# Patient Record
Sex: FEMALE | Race: WHITE | NOT HISPANIC OR LATINO | Employment: FULL TIME | URBAN - METROPOLITAN AREA
[De-identification: names, ages, dates, MRNs, and addresses within clinical notes are randomized per-mention and may not be internally consistent; named-entity substitution may affect disease eponyms.]

---

## 2019-04-21 ENCOUNTER — HOSPITAL ENCOUNTER (OUTPATIENT)
Dept: URGENT CARE | Facility: CLINIC | Age: 24
Discharge: HOME | End: 2019-04-21
Attending: NURSE PRACTITIONER

## 2019-07-17 ENCOUNTER — HOSPITAL ENCOUNTER (OUTPATIENT)
Dept: LAB | Facility: HOSPITAL | Age: 24
Discharge: HOME | End: 2019-07-17
Attending: NURSE PRACTITIONER

## 2020-05-30 ENCOUNTER — HOSPITAL ENCOUNTER (OUTPATIENT)
Dept: OTHER | Facility: HOSPITAL | Age: 25
Discharge: HOME | End: 2020-05-30
Attending: NURSE PRACTITIONER

## 2020-05-30 LAB — SARS-COV-2 RNA SPEC QL NAA+PROBE: NOT DETECTED

## 2020-05-31 ENCOUNTER — HOSPITAL ENCOUNTER (OUTPATIENT)
Dept: OTHER | Facility: HOSPITAL | Age: 25
Discharge: HOME | End: 2020-05-31
Attending: NURSE PRACTITIONER

## 2020-05-31 LAB — SARS-COV-2 RNA SPEC QL NAA+PROBE: NOT DETECTED

## 2020-07-30 ENCOUNTER — HOSPITAL ENCOUNTER (OUTPATIENT)
Dept: OTHER | Facility: HOSPITAL | Age: 25
Discharge: HOME | End: 2020-07-30

## 2020-07-30 LAB
ALBUMIN SERPL-MCNC: 4.9 G/DL (ref 3.5–5)
ALBUMIN/GLOB SERPL: 2 {RATIO} (ref 1.4–2.6)
ALP SERPL-CCNC: 61 U/L (ref 42–98)
ALT SERPL-CCNC: 13 U/L (ref 10–40)
ANION GAP SERPL CALC-SCNC: 21 MMOL/L (ref 8–19)
AST SERPL-CCNC: 17 U/L (ref 15–50)
BASOPHILS # BLD AUTO: 0.04 10*3/UL (ref 0–0.2)
BASOPHILS NFR BLD AUTO: 1 % (ref 0–3)
BILIRUB SERPL-MCNC: 1.25 MG/DL (ref 0.2–1.3)
BUN SERPL-MCNC: 16 MG/DL (ref 5–25)
BUN/CREAT SERPL: 20 {RATIO} (ref 6–20)
CALCIUM SERPL-MCNC: 9.8 MG/DL (ref 8.7–10.4)
CHLORIDE SERPL-SCNC: 103 MMOL/L (ref 99–111)
CHOLEST SERPL-MCNC: 169 MG/DL (ref 107–200)
CHOLEST/HDLC SERPL: 2.5 {RATIO} (ref 3–6)
CONV ABS IMM GRAN: 0 10*3/UL (ref 0–0.2)
CONV CO2: 23 MMOL/L (ref 22–32)
CONV IMMATURE GRAN: 0 % (ref 0–1.8)
CONV TOTAL PROTEIN: 7.4 G/DL (ref 6.3–8.2)
CREAT UR-MCNC: 0.79 MG/DL (ref 0.5–0.9)
DEPRECATED RDW RBC AUTO: 39.7 FL (ref 36.4–46.3)
EOSINOPHIL # BLD AUTO: 0.07 10*3/UL (ref 0–0.7)
EOSINOPHIL # BLD AUTO: 1.7 % (ref 0–7)
ERYTHROCYTE [DISTWIDTH] IN BLOOD BY AUTOMATED COUNT: 11.9 % (ref 11.7–14.4)
GFR SERPLBLD BASED ON 1.73 SQ M-ARVRAT: >60 ML/MIN/{1.73_M2}
GLOBULIN UR ELPH-MCNC: 2.5 G/DL (ref 2–3.5)
GLUCOSE SERPL-MCNC: 83 MG/DL (ref 65–99)
HCT VFR BLD AUTO: 43.3 % (ref 37–47)
HDLC SERPL-MCNC: 67 MG/DL (ref 40–60)
HGB BLD-MCNC: 14.2 G/DL (ref 12–16)
LDLC SERPL CALC-MCNC: 90 MG/DL (ref 70–100)
LYMPHOCYTES # BLD AUTO: 1.39 10*3/UL (ref 1–5)
LYMPHOCYTES NFR BLD AUTO: 33.7 % (ref 20–45)
MCH RBC QN AUTO: 29.8 PG (ref 27–31)
MCHC RBC AUTO-ENTMCNC: 32.8 G/DL (ref 33–37)
MCV RBC AUTO: 91 FL (ref 81–99)
MONOCYTES # BLD AUTO: 0.27 10*3/UL (ref 0.2–1.2)
MONOCYTES NFR BLD AUTO: 6.6 % (ref 3–10)
NEUTROPHILS # BLD AUTO: 2.35 10*3/UL (ref 2–8)
NEUTROPHILS NFR BLD AUTO: 57 % (ref 30–85)
NRBC CBCN: 0 % (ref 0–0.7)
OSMOLALITY SERPL CALC.SUM OF ELEC: 294 MOSM/KG (ref 273–304)
PLATELET # BLD AUTO: 250 10*3/UL (ref 130–400)
PMV BLD AUTO: 9.6 FL (ref 9.4–12.3)
POTASSIUM SERPL-SCNC: 4.5 MMOL/L (ref 3.5–5.3)
RBC # BLD AUTO: 4.76 10*6/UL (ref 4.2–5.4)
SODIUM SERPL-SCNC: 142 MMOL/L (ref 135–147)
TRIGL SERPL-MCNC: 59 MG/DL (ref 40–150)
TSH SERPL-ACNC: 0.87 M[IU]/L (ref 0.27–4.2)
VLDLC SERPL-MCNC: 12 MG/DL (ref 5–37)
WBC # BLD AUTO: 4.12 10*3/UL (ref 4.8–10.8)

## 2020-10-23 ENCOUNTER — HOSPITAL ENCOUNTER (OUTPATIENT)
Dept: OTHER | Facility: HOSPITAL | Age: 25
Discharge: HOME | End: 2020-10-23
Attending: OBSTETRICS & GYNECOLOGY

## 2020-10-28 LAB
CONV LAST MENSTURAL PERIOD: NORMAL
SPECIMEN SOURCE: NORMAL
SPECIMEN SOURCE: NORMAL
THIN PREP CVX: NORMAL

## 2021-01-05 ENCOUNTER — HOSPITAL ENCOUNTER (OUTPATIENT)
Dept: OTHER | Facility: HOSPITAL | Age: 26
Discharge: HOME | End: 2021-01-05
Attending: INTERNAL MEDICINE

## 2021-01-29 ENCOUNTER — HOSPITAL ENCOUNTER (OUTPATIENT)
Dept: OTHER | Facility: HOSPITAL | Age: 26
Discharge: HOME | End: 2021-01-29
Attending: INTERNAL MEDICINE

## 2021-08-04 ENCOUNTER — TRANSCRIBE ORDERS (OUTPATIENT)
Dept: ADMINISTRATIVE | Facility: HOSPITAL | Age: 26
End: 2021-08-04

## 2021-08-04 ENCOUNTER — LAB (OUTPATIENT)
Dept: LAB | Facility: HOSPITAL | Age: 26
End: 2021-08-04
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
## 2021-08-04 DIAGNOSIS — Z77.21 EXPOSURE TO BODY FLUIDS BY CONTAMINATED HYPODERMIC NEEDLE STICK: Primary | ICD-10-CM

## 2021-08-04 DIAGNOSIS — Z77.21 EXPOSURE TO BODY FLUIDS BY CONTAMINATED HYPODERMIC NEEDLE STICK: ICD-10-CM
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
## 2021-08-04 PROCEDURE — 36415 COLL VENOUS BLD VENIPUNCTURE: CPT

## 2021-08-04 PROCEDURE — 86701 HIV-1ANTIBODY: CPT

## 2021-08-04 PROCEDURE — 86803 HEPATITIS C AB TEST: CPT

## 2021-08-04 PROCEDURE — 86702 HIV-2 ANTIBODY: CPT

## 2021-08-05 LAB
HCV AB SER DONR QL: NORMAL
HIV 1 & 2 AB SERPLBLD IA.RAPID: NEGATIVE
HIV 2 AB SERPLBLD QL IA.RAPID: NEGATIVE
HIV1 AB SERPLBLD QL IA.RAPID: NEGATIVE

## 2022-05-18 PROBLEM — Z82.79 FAMILY HISTORY OF CONGENITAL HEART DEFECT: Status: ACTIVE | Noted: 2018-10-25

## 2022-05-18 PROBLEM — J30.9 ALLERGIC RHINITIS: Status: ACTIVE | Noted: 2021-06-23

## 2022-05-18 PROBLEM — J45.909 ASTHMA: Status: ACTIVE | Noted: 2021-06-23

## 2022-05-18 PROBLEM — B07.9 VIRAL WARTS: Status: ACTIVE | Noted: 2021-06-23

## 2022-05-19 ENCOUNTER — OFFICE VISIT (OUTPATIENT)
Dept: OBSTETRICS AND GYNECOLOGY | Facility: CLINIC | Age: 27
End: 2022-05-19

## 2022-05-19 VITALS
SYSTOLIC BLOOD PRESSURE: 115 MMHG | HEIGHT: 64 IN | WEIGHT: 162.6 LBS | HEART RATE: 81 BPM | BODY MASS INDEX: 27.76 KG/M2 | DIASTOLIC BLOOD PRESSURE: 73 MMHG

## 2022-05-19 DIAGNOSIS — Z01.419 ENCOUNTER FOR ANNUAL ROUTINE GYNECOLOGICAL EXAMINATION: Primary | ICD-10-CM

## 2022-05-19 PROCEDURE — G0123 SCREEN CERV/VAG THIN LAYER: HCPCS | Performed by: NURSE PRACTITIONER

## 2022-05-19 PROCEDURE — 87624 HPV HI-RISK TYP POOLED RSLT: CPT | Performed by: NURSE PRACTITIONER

## 2022-05-19 PROCEDURE — 99395 PREV VISIT EST AGE 18-39: CPT | Performed by: NURSE PRACTITIONER

## 2022-05-19 RX ORDER — CETIRIZINE HYDROCHLORIDE 10 MG/1
10 TABLET ORAL AS NEEDED
COMMUNITY

## 2022-05-19 RX ORDER — SERTRALINE HYDROCHLORIDE 100 MG/1
1 TABLET, FILM COATED ORAL DAILY
COMMUNITY
Start: 2022-05-09

## 2022-05-19 NOTE — PROGRESS NOTES
GYN Annual Exam     CC - Here for annual exam.        HPI  Grisel Logan is a 26 y.o. female, , who presents for annual well woman exam. Patient's last menstrual period was 2022 (exact date)..  Periods are regular every 25-35 days, lasting 7 days. .  Dysmenorrhea:none.  Patient reports problems with: none.  There were no changes to her medical or surgical history since her last visit.. Partner Status: Marital Status: .  She is sexually active. She has not had new partners since her last STD testing. She does not desire STD testing. She does not use condoms with IC.. She has had her HPV vaccines.     Additional OB/GYN History   Current contraception: contraceptive methods: IUD.  Insertion date:   Desires to: continue contraception  Last Pap :   Last Completed Pap Smear     This patient has no relevant Health Maintenance data.        History of abnormal Pap smear: no  Family history of uterine, colon, breast, or ovarian cancer: yes - pat. grandmother colon  Exercises Regularly:no  Feelings of Anxiety or Depression: no  Tobacco Usage?: No     OB History        1    Para   1    Term   1            AB        Living   1       SAB        IAB        Ectopic        Molar        Multiple        Live Births   1                Health Maintenance   Topic Date Due   • Annual Gynecologic Pelvic and Breast Exam  Never done   • ANNUAL PHYSICAL  Never done   • Pneumococcal Vaccine 0-64 (1 - PCV) Never done   • PAP SMEAR  Never done   • INFLUENZA VACCINE  2022   • TDAP/TD VACCINES (4 - Td or Tdap) 2029   • HEPATITIS C SCREENING  Completed   • COVID-19 Vaccine  Completed   • HPV VACCINES  Completed       The additional following portions of the patient's history were reviewed and updated as appropriate: allergies, current medications, past family history, past medical history, past social history, past surgical history and problem list.    Review of Systems   Constitutional: Negative.   "  HENT: Negative.    Eyes: Negative.    Respiratory: Negative.    Cardiovascular: Negative.    Gastrointestinal: Negative.    Endocrine: Negative.    Genitourinary: Negative.    Musculoskeletal: Negative.    Skin: Negative.    Allergic/Immunologic: Negative.         No new allergies.   Neurological: Negative.    Hematological: Negative.    Psychiatric/Behavioral: Negative.          I have reviewed and agree with the HPI, ROS, and historical information as entered above. Marino Iniguez, APRN    Objective   /73 (BP Location: Right arm, Patient Position: Sitting, Cuff Size: Adult)   Pulse 81   Ht 162.6 cm (64\")   Wt 73.8 kg (162 lb 9.6 oz)   LMP 05/17/2022 (Exact Date)   Breastfeeding No   BMI 27.91 kg/m²     Physical Exam  Vitals and nursing note reviewed. Exam conducted with a chaperone present.   Constitutional:       Appearance: Normal appearance. She is well-developed and well-groomed.   HENT:      Head: Normocephalic.   Eyes:      Pupils: Pupils are equal, round, and reactive to light.   Neck:      Thyroid: No thyroid mass or thyromegaly.   Cardiovascular:      Rate and Rhythm: Normal rate and regular rhythm.      Heart sounds: Normal heart sounds.   Pulmonary:      Effort: Pulmonary effort is normal.      Breath sounds: Normal breath sounds.   Chest:   Breasts:      Right: Normal. No inverted nipple, mass, nipple discharge or skin change.      Left: Normal. No inverted nipple, mass, nipple discharge or skin change.       Abdominal:      General: Abdomen is flat. Bowel sounds are normal.      Palpations: Abdomen is soft.      Tenderness: There is no abdominal tenderness.   Genitourinary:     General: Normal vulva.      Exam position: Lithotomy position.      Pubic Area: No rash or pubic lice.       Vagina: Normal.      Cervix: No cervical motion tenderness.      Uterus: Normal.       Adnexa: Right adnexa normal and left adnexa normal.        Right: No mass, tenderness or fullness.          Left: No " mass, tenderness or fullness.        Rectum: Normal.         Musculoskeletal:      Cervical back: Normal range of motion and neck supple.   Skin:     General: Skin is warm and dry.   Neurological:      General: No focal deficit present.      Mental Status: She is alert.   Psychiatric:         Attention and Perception: Attention and perception normal.         Mood and Affect: Mood and affect normal.         Speech: Speech normal.         Behavior: Behavior normal. Behavior is cooperative.         Cognition and Memory: Cognition normal.         Judgment: Judgment normal.            Assessment and Plan    Problem List Items Addressed This Visit    None     Visit Diagnoses     Encounter for annual routine gynecological examination    -  Primary    Relevant Orders    IGP,rfx Aptima HPV All Pth          1. GYN annual well woman exam.   2. Reviewed monthly self breast exams.  Instructed to call with lumps, pain, or breast discharge.    3. Reviewed exercise as a preventative health measures.   4. Reccommended Flu Vaccine in Fall of each year.  5. RTC in 1 year or PRN with problems      Marino Iniguez, APRN  05/19/2022

## 2022-05-28 LAB
CONV .: ABNORMAL
CYTOLOGIST CVX/VAG CYTO: ABNORMAL
CYTOLOGY CVX/VAG DOC CYTO: ABNORMAL
CYTOLOGY CVX/VAG DOC THIN PREP: ABNORMAL
DX ICD CODE: ABNORMAL
DX ICD CODE: ABNORMAL
HIV 1 & 2 AB SER-IMP: ABNORMAL
HPV I/H RISK 4 DNA CVX QL PROBE+SIG AMP: NEGATIVE
OTHER STN SPEC: ABNORMAL
PATHOLOGIST CVX/VAG CYTO: ABNORMAL
STAT OF ADQ CVX/VAG CYTO-IMP: ABNORMAL

## 2022-06-07 ENCOUNTER — OFFICE VISIT (OUTPATIENT)
Dept: OBSTETRICS AND GYNECOLOGY | Facility: CLINIC | Age: 27
End: 2022-06-07

## 2022-06-07 VITALS
DIASTOLIC BLOOD PRESSURE: 76 MMHG | HEIGHT: 64 IN | HEART RATE: 97 BPM | SYSTOLIC BLOOD PRESSURE: 118 MMHG | WEIGHT: 161.8 LBS | BODY MASS INDEX: 27.62 KG/M2

## 2022-06-07 DIAGNOSIS — Z30.432 ENCOUNTER FOR IUD REMOVAL: Primary | ICD-10-CM

## 2022-06-07 PROCEDURE — 58301 REMOVE INTRAUTERINE DEVICE: CPT | Performed by: OBSTETRICS & GYNECOLOGY

## 2022-06-07 NOTE — PROGRESS NOTES
"IUD Removal      CC: Presents for IUD removal     I reviewed the procedure in detail.  She understand the potential risks include, but are not limited to, pain, bleeding, and infection.  Her questions have been answered.    Subjective:  Here for IUD removal.  Planning pregnancy.  Already on prenatal vitamins.    Objective:  /76   Pulse 97   Ht 162.6 cm (64\")   Wt 73.4 kg (161 lb 12.8 oz)   LMP 05/17/2022 (Exact Date)   BMI 27.77 kg/m²     Physical Exam  Vitals and nursing note reviewed. Exam conducted with a chaperone present.   Constitutional:       General: She is not in acute distress.     Appearance: Normal appearance. She is normal weight. She is not ill-appearing.   Abdominal:      General: Abdomen is flat. There is no distension.      Palpations: Abdomen is soft. There is no mass.      Tenderness: There is no abdominal tenderness. There is no guarding or rebound.      Hernia: No hernia is present.   Genitourinary:     General: Normal vulva.      Exam position: Lithotomy position.      Pubic Area: No rash.       Labia:         Right: No rash, tenderness, lesion or injury.         Left: No rash, tenderness, lesion or injury.       Urethra: No prolapse, urethral pain, urethral swelling or urethral lesion.      Vagina: Normal.      Cervix: Normal.      Comments: The patient's IUD string was approximately 2 cm in length.  It was easily grasped with a ring forcep and extracted without difficulty.  After the extraction the IUD was inspected and noted to be intact.  There is no bleeding after removal of the IUD.  The patient tolerated procedure well and with minimal cramping post procedure.  Musculoskeletal:      Right lower leg: No edema.      Left lower leg: No edema.   Skin:     General: Skin is warm and dry.      Findings: No rash.   Neurological:      Mental Status: She is alert and oriented to person, place, and time.   Psychiatric:         Mood and Affect: Mood normal.         Behavior: Behavior " normal.         Thought Content: Thought content normal.         Judgment: Judgment normal.     ASSESSMENT AND PLAN:    Diagnoses and all orders for this visit:    1. Encounter for IUD removal (Primary)  Assessment & Plan:  Successful IUD removal  Continue prenatal vitamins  Commend awaiting until first menstrual cycle before attempting pregnancy          Counseling:  She understand she can become pregnant immediately.  I recommend she keep track of menses and RTO if <q21d, >7d long, heavy or painful.    R/B/A/SE/efficacy of all BC options reviewed with respect to her individual medical history.   She has decided on None for BC.  If she desires pregnancy I have encouraged a healthy lifestyle and PNV.     PRECAUTIONS - She was advised to watch for fever, chills, vaginal discharge or odor.      Follow Up:  Return if symptoms worsen or fail to improve.        Arturo Quach MD  06/07/2022

## 2022-06-08 PROBLEM — Z30.432 ENCOUNTER FOR IUD REMOVAL: Status: ACTIVE | Noted: 2022-06-08

## 2022-06-08 NOTE — ASSESSMENT & PLAN NOTE
Successful IUD removal  Continue prenatal vitamins  Commend awaiting until first menstrual cycle before attempting pregnancy

## 2022-12-28 ENCOUNTER — OFFICE VISIT (OUTPATIENT)
Dept: OBSTETRICS AND GYNECOLOGY | Facility: CLINIC | Age: 27
End: 2022-12-28

## 2022-12-28 VITALS
HEART RATE: 100 BPM | BODY MASS INDEX: 28.34 KG/M2 | DIASTOLIC BLOOD PRESSURE: 68 MMHG | HEIGHT: 64 IN | SYSTOLIC BLOOD PRESSURE: 113 MMHG | WEIGHT: 166 LBS

## 2022-12-28 DIAGNOSIS — N81.2 INCOMPLETE UTEROVAGINAL PROLAPSE: Primary | ICD-10-CM

## 2022-12-28 PROCEDURE — 99213 OFFICE O/P EST LOW 20 MIN: CPT | Performed by: OBSTETRICS & GYNECOLOGY

## 2022-12-28 NOTE — PROGRESS NOTES
"GYN Visit    CC: Pelvic prolapse    HPI:   27 y.o. who presents with concerns of pelvic prolapse.  Patient reports having some pelvic discomfort with intercourse.  She reports having some pelvic pressure particular with long periods of standing as well as she thinks she can feel her cervix when in the shower.      History: PMHx, Meds, Allergies, PSHx, Social Hx, and POBHx all reviewed and updated.    /68   Pulse 100   Ht 162.6 cm (64\")   Wt 75.3 kg (166 lb)   LMP 12/15/2022   Breastfeeding No   BMI 28.49 kg/m²     Physical Exam  Vitals and nursing note reviewed. Exam conducted with a chaperone present.   Constitutional:       General: She is not in acute distress.     Appearance: Normal appearance. She is not ill-appearing.   HENT:      Head: Normocephalic and atraumatic.   Genitourinary:     General: Normal vulva.      Exam position: Lithotomy position.      Labia:         Right: No rash, tenderness, lesion or injury.         Left: No rash, tenderness, lesion or injury.       Vagina: Normal.      Cervix: Normal.      Uterus: With uterine prolapse.       Comments: There is a small grade 1 cystocele.  With Valsalva the cervix descends to about 2 cm above the hymenal ring.  Musculoskeletal:         General: No swelling.      Right lower leg: No edema.      Left lower leg: No edema.   Skin:     General: Skin is warm and dry.      Findings: No rash.   Neurological:      Mental Status: She is alert and oriented to person, place, and time.   Psychiatric:         Mood and Affect: Mood normal.         Behavior: Behavior normal.         Thought Content: Thought content normal.         Judgment: Judgment normal.           ASSESSMENT AND PLAN:  Diagnoses and all orders for this visit:    1. Incomplete uterovaginal prolapse (Primary)  Assessment & Plan:  The patient does have some uterovaginal prolapse.  The prolapse is mild to moderate at this time.  The patient is having some symptoms.  She does desire " future children and is actively trying to conceive.  We discussed that management of prolapse usually involves either physical therapy or surgery, or both.  I have recommended avoiding surgical correction of prolapse until childbearing is complete.  We discussed that the surgical management of prolapse can complicate pregnancies and pregnancies can cause recurrent prolapse after surgical corrections.  The patient expressed some concerns about prolapse affecting her ability to get pregnant.  I discussed that I did not have concerns that her prolapse was creating a fertility issue for her at this time.  We discussed that prolapse is simply a descent of the pelvic organs lower in the pelvis and is very common after childbirth.  We discussed that childbirth is the #1 risk factor for pelvic organ prolapse.  We discussed chronic standing and heavy lifting can also worsen prolapse.  At this time, I recommended the patient really focus on Kegel exercises and do these several times a day.  We discussed the option of pelvic floor therapy.  I am not sure how much benefit pelvic floor therapy would be over Kegel exercises at this time.  I recommended OTC Tylenol and or ibuprofen for any pelvic discomfort that she may have.  Recommend a daily multivitamin with folic acid or prenatal vitamin if attempting pregnancy.        Follow Up:  Return for Annual physical.    Arturo Quach MD  12/28/2022

## 2022-12-29 PROBLEM — Z30.432 ENCOUNTER FOR IUD REMOVAL: Status: RESOLVED | Noted: 2022-06-08 | Resolved: 2022-12-29

## 2022-12-29 PROBLEM — N81.2 INCOMPLETE UTEROVAGINAL PROLAPSE: Status: ACTIVE | Noted: 2022-12-29

## 2022-12-29 PROBLEM — J30.9 ALLERGIC RHINITIS: Status: RESOLVED | Noted: 2021-06-23 | Resolved: 2022-12-29

## 2022-12-29 NOTE — ASSESSMENT & PLAN NOTE
The patient does have some uterovaginal prolapse.  The prolapse is mild to moderate at this time.  The patient is having some symptoms.  She does desire future children and is actively trying to conceive.  We discussed that management of prolapse usually involves either physical therapy or surgery, or both.  I have recommended avoiding surgical correction of prolapse until childbearing is complete.  We discussed that the surgical management of prolapse can complicate pregnancies and pregnancies can cause recurrent prolapse after surgical corrections.  The patient expressed some concerns about prolapse affecting her ability to get pregnant.  I discussed that I did not have concerns that her prolapse was creating a fertility issue for her at this time.  We discussed that prolapse is simply a descent of the pelvic organs lower in the pelvis and is very common after childbirth.  We discussed that childbirth is the #1 risk factor for pelvic organ prolapse.  We discussed chronic standing and heavy lifting can also worsen prolapse.  At this time, I recommended the patient really focus on Kegel exercises and do these several times a day.  We discussed the option of pelvic floor therapy.  I am not sure how much benefit pelvic floor therapy would be over Kegel exercises at this time.  I recommended OTC Tylenol and or ibuprofen for any pelvic discomfort that she may have.  Recommend a daily multivitamin with folic acid or prenatal vitamin if attempting pregnancy.

## 2023-03-29 ENCOUNTER — TELEPHONE (OUTPATIENT)
Dept: OBSTETRICS AND GYNECOLOGY | Facility: CLINIC | Age: 28
End: 2023-03-29
Payer: COMMERCIAL

## 2023-03-29 DIAGNOSIS — O36.80X0 PREGNANCY WITH INCONCLUSIVE FETAL VIABILITY, SINGLE OR UNSPECIFIED FETUS: Primary | ICD-10-CM

## 2023-03-29 NOTE — TELEPHONE ENCOUNTER
Patient recently found out that she is pregnant, LMP 02/23/23.  She was scheduled for RGOB 06/12/2023.  I am pending an ultrasound order for signature.

## 2023-04-21 ENCOUNTER — HOSPITAL ENCOUNTER (OUTPATIENT)
Dept: ULTRASOUND IMAGING | Facility: HOSPITAL | Age: 28
Discharge: HOME | End: 2023-04-21
Payer: COMMERCIAL

## 2023-04-21 ENCOUNTER — TELEPHONE (OUTPATIENT)
Dept: OBSTETRICS AND GYNECOLOGY | Facility: CLINIC | Age: 28
End: 2023-04-21
Payer: COMMERCIAL

## 2023-04-21 DIAGNOSIS — O36.80X0 PREGNANCY WITH INCONCLUSIVE FETAL VIABILITY, SINGLE OR UNSPECIFIED FETUS: ICD-10-CM

## 2023-04-21 DIAGNOSIS — O36.80X0 PREGNANCY WITH INCONCLUSIVE FETAL VIABILITY, SINGLE OR UNSPECIFIED FETUS: Primary | ICD-10-CM

## 2023-04-21 PROCEDURE — 76801 OB US < 14 WKS SINGLE FETUS: CPT

## 2023-04-21 NOTE — TELEPHONE ENCOUNTER
Received call from Dr. Hernandez in radiology for concerns for early pregnancy loss. 5 mm CRL no FCA. Recommendation for follow up U/S in clinic in 7-10 days for confirmation of early pregnancy loss. Order placed for follow up U/S. Miscarriage, bleeding precautions given.     Baljeet Gonzalez MD

## 2023-04-24 ENCOUNTER — PREP FOR SURGERY (OUTPATIENT)
Dept: OTHER | Facility: HOSPITAL | Age: 28
End: 2023-04-24
Payer: COMMERCIAL

## 2023-04-24 ENCOUNTER — TELEPHONE (OUTPATIENT)
Dept: OBSTETRICS AND GYNECOLOGY | Facility: CLINIC | Age: 28
End: 2023-04-24
Payer: COMMERCIAL

## 2023-04-24 ENCOUNTER — OFFICE VISIT (OUTPATIENT)
Dept: OBSTETRICS AND GYNECOLOGY | Facility: CLINIC | Age: 28
End: 2023-04-24
Payer: COMMERCIAL

## 2023-04-24 ENCOUNTER — PATIENT MESSAGE (OUTPATIENT)
Dept: OBSTETRICS AND GYNECOLOGY | Facility: CLINIC | Age: 28
End: 2023-04-24
Payer: COMMERCIAL

## 2023-04-24 VITALS
DIASTOLIC BLOOD PRESSURE: 60 MMHG | WEIGHT: 165 LBS | BODY MASS INDEX: 28.17 KG/M2 | HEIGHT: 64 IN | HEART RATE: 75 BPM | SYSTOLIC BLOOD PRESSURE: 105 MMHG

## 2023-04-24 DIAGNOSIS — Z32.01 PREGNANCY TEST POSITIVE: Primary | ICD-10-CM

## 2023-04-24 DIAGNOSIS — O02.1 MISSED ABORTION: ICD-10-CM

## 2023-04-24 DIAGNOSIS — O02.1 MISSED ABORTION: Primary | ICD-10-CM

## 2023-04-24 LAB
B-HCG UR QL: POSITIVE
EXPIRATION DATE: ABNORMAL
INTERNAL NEGATIVE CONTROL: ABNORMAL
INTERNAL POSITIVE CONTROL: ABNORMAL
Lab: ABNORMAL

## 2023-04-24 PROCEDURE — S0260 H&P FOR SURGERY: HCPCS | Performed by: OBSTETRICS & GYNECOLOGY

## 2023-04-24 RX ORDER — ONDANSETRON 2 MG/ML
4 INJECTION INTRAMUSCULAR; INTRAVENOUS EVERY 6 HOURS PRN
Status: CANCELLED | OUTPATIENT
Start: 2023-04-24

## 2023-04-24 RX ORDER — SODIUM CHLORIDE, SODIUM LACTATE, POTASSIUM CHLORIDE, CALCIUM CHLORIDE 600; 310; 30; 20 MG/100ML; MG/100ML; MG/100ML; MG/100ML
150 INJECTION, SOLUTION INTRAVENOUS CONTINUOUS
Status: CANCELLED | OUTPATIENT
Start: 2023-04-24

## 2023-04-24 NOTE — TELEPHONE ENCOUNTER
JUSTYN  Please advise.  The patient was scheduled for 04/28/23 for an ultrasound and follow up with Dr. Gonzalez.  The patient told me she is experiencing nausea, pain that comes and goes, dizziness and passing small clots now.  She is supposed to be going out of the country 05/01/23 but realizes she may only do so if she is medically cleared.  She is concerned and would like to know what to do next and if the appointment scheduled for 04/28/23 is still ok?

## 2023-04-24 NOTE — H&P
GYN HISTORY & PHYSICAL      Patient Name: Grisel Logan  : 1995  MRN: [id number]    Subjective     Chief Complaint:   Chief Complaint   Patient presents with   • Follow-up     Follow up for MAB        HPI:  27 y.o.  Patient's last menstrual period was 2023.  Social History     Substance and Sexual Activity   Sexual Activity Yes   • Partners: Male   • Birth control/protection: None       US Ob < 14 Weeks Single or First Gestation (2023 15:24)     VB now intermittent.  Clots last night.  Changing a pad only 2-3x/day.  No pain.   A+ by [employer]    Patient had ultrasound ordered by [facility] office after she had a positive pregnancy test and it showed fetal demise at 6+ weeks.  Patient should be 8+4 weeks by LMP.  Patient desired a follow-up visit sooner than was scheduled with Dr. Schwartz as she is leaving the country for [facility] on 1st of May.    ROS: All negative except listed in HPI    Personal History     PMHx:    Past Medical History:   Diagnosis Date   • Anxiety    • Depression        OBHx:   OB History    Para Term  AB Living   2 1 1     1   SAB IAB Ectopic Molar Multiple Live Births             1      # Outcome Date GA Lbr Kj/2nd Weight Sex Delivery Anes PTL Lv   2 Current            1 Term 19 40w0d   M Vag-Spont  N MYCHAL        Medications:  sertraline    Allergies:  No Known Allergies    PSHx:   Past Surgical History:   Procedure Laterality Date   • CYST REMOVAL      bronchial cleft cyst   • WISDOM TOOTH EXTRACTION         Social History:    reports that she has never smoked. She has never used smokeless tobacco. She reports that she does not currently use alcohol. She reports that she does not currently use drugs.    Family History:   Family History   Problem Relation Age of Onset   • Hypertension Father    • Hypertension Mother    • Diabetes Paternal Grandfather    • Colon cancer Paternal Grandmother    • Hypertension Paternal Grandmother    • Stroke Paternal  Grandmother    • Cancer Maternal Grandmother         Pt unsure   • Hypertension Maternal Grandfather    • Stomach cancer Maternal Grandfather    • Clotting disorder Maternal Grandfather    • Breast cancer Neg Hx    • Ovarian cancer Neg Hx        Immunizations: Non contributory to this admission        Objective     Vitals:   Heart Rate:  [75] 75  BP: (105)/(60) 105/60    PHYSICAL EXAM:   General- NAD, alert and oriented, appropriate  Psych- Normal mood, good memory  Cardiovascular-not examined  Respiratory-speaking in full sentences no shortness of air  Abdomen- NABS, soft, non distended, non tender, no masses    External genitalia- Normal female, no lesions  Urethra/meatus- Normal, no masses, non tender, no prolapse  Bladder- Normal, no masses, non tender, no prolapse  Vagina- Normal, no atrophy, no lesions, no discharge, no prolapse,  small amount of maroon blood in vault  Cervix- Closed  Uterus- Small size, normal shape & consistency.  Non tender, mobile, & no prolapse, Retroverted   Bedside US: INDICATION: viability size less than dates by LMP, COMPARISON: 23, METHOD: Transvaginal, FINDINGS: Crown-rump length 6 weeks and 2 days (6+1 on ), no fetal heart rate, color flow used to assist,  IMPRESSION: 6 weeks and 2 days by ultrasound, no fetal heart rate, consistent with missed AB  Adnexa- No mass, non tender  Anus/Rectum/Perineum- Not performed    Lymphatic- No palpable groin nodes  Extremities- No edema, no cyanosis    Skin- No lesions, no rashes    Lab/Imaging/Other: See HPI      Assessment / Plan     Assessment:  Diagnoses and all orders for this visit:    1. Missed   Overview:  6 weeks by ultrasound, 8 weeks by LMP      Plan:   • Suction D&C.  • Counseling: Risks and benefits and alternatives of surgery were discussed with patient.  Risks are not limited to anesthesia, bleeding, blood transfusion, infection, damage to surrounding organs, wound separation, re-operation, thromboembolic disease,  death.  • See separate written and signed consent for surgical specific risks and benefits.          Signature: Electronically signed by Nidia Pollard DO, 04/24/23, 4:29 PM EDT.      [facility]  [address] [place] [address]  Dept: [phone]  Dept Fax: [phone]  Loc: [phone]  Loc Fax: [phone]

## 2023-04-24 NOTE — TELEPHONE ENCOUNTER
From: Grisel Logan  To: Arturo Quach  Sent: 4/24/2023 9:39 AM EDT  Subject: Possible Miscarriage     Dr. Quach,    I have called to be seen in office and am awaiting a response from a telephone encounter that was sent. I'm just a little anxious so messaging you directly as well.     As my chart states I was seen Friday for transvaginal ultrasound. Baby only measured 6 weeks, but based on last menstrual date I should be 8 weeks gestation. No fetal heartbeat noted.    *I've had spotting from 4/14 - 4/21.  *4/21 I started with increased bleeding that has been occuring to the present.   *I have started passing small clots.  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 Coupled with cramping that comes and goes and some dizziness I would like to be seen in the office to evaluate if I'm indeed miscarrying or not.  * If a D/C is needed I need to be seen prior to Friday when I'm currently scheduled to be seen as I'm scheduled to go out of country 5/1.    Thank you for your time.    Grisel Logan

## 2023-04-24 NOTE — PROGRESS NOTES
GYN HISTORY & PHYSICAL      Patient Name: Grisel Logan  : 1995  MRN: [id number]    Subjective     Chief Complaint:   Chief Complaint   Patient presents with   • Follow-up     Follow up for MAB        HPI:  27 y.o.  Patient's last menstrual period was 2023.  Social History     Substance and Sexual Activity   Sexual Activity Yes   • Partners: Male   • Birth control/protection: None       US Ob < 14 Weeks Single or First Gestation (2023 15:24)     VB now intermittent.  Clots last night.  Changing a pad only 2-3x/day.  No pain.   A+ by [employer]    Patient had ultrasound ordered by her office after she had a positive pregnancy test and it showed fetal demise at 6+ weeks.  Patient should be 8+4 weeks by LMP.  Patient desired a follow-up visit sooner than was scheduled with Dr. Schwartz as she is leaving the country for [facility] on 1 May.    ROS: All negative except listed in HPI    Personal History     PMHx:    Past Medical History:   Diagnosis Date   • Anxiety    • Depression        OBHx:   OB History    Para Term  AB Living   2 1 1     1   SAB IAB Ectopic Molar Multiple Live Births             1      # Outcome Date GA Lbr Kj/2nd Weight Sex Delivery Anes PTL Lv   2 Current            1 Term 19 40w0d   M Vag-Spont  N MYCHAL        Medications:  sertraline    Allergies:  No Known Allergies    PSHx:   Past Surgical History:   Procedure Laterality Date   • CYST REMOVAL      bronchial cleft cyst   • WISDOM TOOTH EXTRACTION         Social History:    reports that she has never smoked. She has never used smokeless tobacco. She reports that she does not currently use alcohol. She reports that she does not currently use drugs.    Family History:   Family History   Problem Relation Age of Onset   • Hypertension Father    • Hypertension Mother    • Diabetes Paternal Grandfather    • Colon cancer Paternal Grandmother    • Hypertension Paternal Grandmother    • Stroke Paternal  Grandmother    • Cancer Maternal Grandmother         Pt unsure   • Hypertension Maternal Grandfather    • Stomach cancer Maternal Grandfather    • Clotting disorder Maternal Grandfather    • Breast cancer Neg Hx    • Ovarian cancer Neg Hx        Immunizations: Non contributory to this admission        Objective     Vitals:   Heart Rate:  [75] 75  BP: (105)/(60) 105/60    PHYSICAL EXAM:   General- NAD, alert and oriented, appropriate  Psych- Normal mood, good memory  Cardiovascular-not examined  Respiratory-speaking in full sentences no shortness of air  Abdomen- NABS, soft, non distended, non tender, no masses    External genitalia- Normal female, no lesions  Urethra/meatus- Normal, no masses, non tender, no prolapse  Bladder- Normal, no masses, non tender, no prolapse  Vagina- Normal, no atrophy, no lesions, no discharge, no prolapse, small amount of maroon blood in vault  Cervix- Closed  Uterus- Small size, normal shape & consistency.  Non tender, mobile, & no prolapse, Retroverted   Bedside US: INDICATION: viability size less than dates by LMP, COMPARISON: 23, METHOD: Transvaginal, FINDINGS: Crown-rump length 6 weeks and 2 days (6+1 on ), no fetal heart rate, color flow used to assist,  IMPRESSION: 6 weeks and 2 days by ultrasound, no fetal heart rate, consistent with missed AB  Adnexa- No mass, non tender  Anus/Rectum/Perineum- Not performed    Lymphatic- No palpable groin nodes  Extremities- No edema, no cyanosis    Skin- No lesions, no rashes    Lab/Imaging/Other: See HPI      Assessment / Plan     Assessment:  Diagnoses and all orders for this visit:    1. Pregnancy test positive (Primary)  -     POC Pregnancy, Urine    2. Missed   Overview:  Added automatically from request for surgery [id number]        Plan:   • We discussed the risk, benefits, alternatives, side effects, efficacy of expectant management, medication management, versus dilation and curettage.  Patient desires suction  D&C.  • Counseling: Risks and benefits and alternatives of surgery were discussed with patient.  Risks are not limited to anesthesia, bleeding, blood transfusion, infection, damage to surrounding organs, wound separation, re-operation, thromboembolic disease, death.  • See separate written and signed consent for surgical specific risks and benefits.    Return in about 4 days (around 4/28/2023) for Postop OV.          Signature: Electronically signed by Nidia Pollard DO, 04/24/23, 4:29 PM EDT.      [facility]  [address] [place] [address]  Dept: [phone]  Dept Fax: [phone]  Loc: [phone]  Loc Fax: [phone]

## 2023-04-24 NOTE — TELEPHONE ENCOUNTER
I have spoken with the patient.  Dr. Gonzalez had talked with her last week and due to the last ultrasound she had, he had her scheduled for a follow up on 04/28/23.  She has had more concerning symptoms since.  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

## 2023-04-24 NOTE — TELEPHONE ENCOUNTER
Caller: Grisel Logan    Relationship to patient: Self    Best call back number: [phone]    PT IS BETWEEN TO 6-8 WKS GEST.  SHE BELIEVES SHE IS HAVING A MISCARRIAGE.   PT HAS BEEN SPOTTING  FOR ABOUT A WK INCREASED BLEEDING SINCE Friday.(TOTAL DAYS BLEEDING ABOUT 12). SHE HAS BEEN HAVING CLOTS SINCE LAST NIGHT.  CRAMPS COME AND GO.  PT IS GO[place] OUT OF [place] ON 05-.  PT WOULD LIKE TO BEE SEEN TODAY OR AS SOON AS POSSIBLE.  SHE DOES HAVE AN APPT ON 04- BUT CANT WAIT THAT LONG TO BE SEEN.  PLEASE CALL PT BACK AT ANYTIME AND CAN LVM.     UNABLE TO WT CALL.  THANK YOU.

## 2023-04-24 NOTE — TELEPHONE ENCOUNTER
The patient was resting and not feeling well so I spoke with her  Miguel who is on the patient's verbal agreement.  He said she would like to be seen today in case she were to need a D+C.  I have scheduled her for 2:15 pm.

## 2023-04-26 ENCOUNTER — ANESTHESIA (OUTPATIENT)
Dept: PERIOP | Facility: HOSPITAL | Age: 28
End: 2023-04-26
Payer: COMMERCIAL

## 2023-04-26 ENCOUNTER — ANESTHESIA EVENT (OUTPATIENT)
Dept: PERIOP | Facility: HOSPITAL | Age: 28
End: 2023-04-26
Payer: COMMERCIAL

## 2023-04-26 ENCOUNTER — HOSPITAL ENCOUNTER (OUTPATIENT)
Facility: HOSPITAL | Age: 28
Setting detail: HOSPITAL OUTPATIENT SURGERY
Discharge: HOME | End: 2023-04-26
Attending: OBSTETRICS & GYNECOLOGY | Admitting: OBSTETRICS & GYNECOLOGY
Payer: COMMERCIAL

## 2023-04-26 VITALS
HEART RATE: 90 BPM | SYSTOLIC BLOOD PRESSURE: 116 MMHG | BODY MASS INDEX: 27.81 KG/M2 | HEIGHT: 65 IN | DIASTOLIC BLOOD PRESSURE: 65 MMHG | RESPIRATION RATE: 14 BRPM | TEMPERATURE: 97.7 F | WEIGHT: 166.89 LBS | OXYGEN SATURATION: 100 %

## 2023-04-26 DIAGNOSIS — O02.1 MISSED ABORTION: ICD-10-CM

## 2023-04-26 LAB
ABO GROUP BLD: NORMAL
ABO GROUP BLD: NORMAL
BLD GP AB SCN SERPL QL: NEGATIVE
RH BLD: POSITIVE
RH BLD: POSITIVE
T&S EXPIRATION DATE: NORMAL

## 2023-04-26 PROCEDURE — 86850 RBC ANTIBODY SCREEN: CPT | Performed by: OBSTETRICS & GYNECOLOGY

## 2023-04-26 PROCEDURE — 86900 BLOOD TYPING SEROLOGIC ABO: CPT

## 2023-04-26 PROCEDURE — 86901 BLOOD TYPING SEROLOGIC RH(D): CPT | Performed by: OBSTETRICS & GYNECOLOGY

## 2023-04-26 PROCEDURE — 59820 CARE OF MISCARRIAGE: CPT | Performed by: OBSTETRICS & GYNECOLOGY

## 2023-04-26 PROCEDURE — 25010000002 DEXAMETHASONE PER 1 MG: Performed by: NURSE ANESTHETIST, CERTIFIED REGISTERED

## 2023-04-26 PROCEDURE — S0260 H&P FOR SURGERY: HCPCS | Performed by: OBSTETRICS & GYNECOLOGY

## 2023-04-26 PROCEDURE — 25010000002 FENTANYL CITRATE (PF) 50 MCG/ML SOLUTION: Performed by: NURSE ANESTHETIST, CERTIFIED REGISTERED

## 2023-04-26 PROCEDURE — 25010000002 ONDANSETRON PER 1 MG: Performed by: NURSE ANESTHETIST, CERTIFIED REGISTERED

## 2023-04-26 PROCEDURE — 86901 BLOOD TYPING SEROLOGIC RH(D): CPT

## 2023-04-26 PROCEDURE — 25010000002 PROPOFOL 10 MG/ML EMULSION: Performed by: NURSE ANESTHETIST, CERTIFIED REGISTERED

## 2023-04-26 PROCEDURE — 88305 TISSUE EXAM BY PATHOLOGIST: CPT | Performed by: OBSTETRICS & GYNECOLOGY

## 2023-04-26 PROCEDURE — 86900 BLOOD TYPING SEROLOGIC ABO: CPT | Performed by: OBSTETRICS & GYNECOLOGY

## 2023-04-26 PROCEDURE — 25010000002 MIDAZOLAM PER 1MG: Performed by: ANESTHESIOLOGY

## 2023-04-26 PROCEDURE — 25010000002 KETOROLAC TROMETHAMINE PER 15 MG: Performed by: NURSE ANESTHETIST, CERTIFIED REGISTERED

## 2023-04-26 RX ORDER — DEXMEDETOMIDINE HYDROCHLORIDE 100 UG/ML
INJECTION, SOLUTION INTRAVENOUS AS NEEDED
Status: DISCONTINUED | OUTPATIENT
Start: 2023-04-26 | End: 2023-04-26 | Stop reason: SURG

## 2023-04-26 RX ORDER — IBUPROFEN 600 MG/1
600 TABLET ORAL EVERY 6 HOURS PRN
Status: DISCONTINUED | OUTPATIENT
Start: 2023-04-26 | End: 2023-04-26 | Stop reason: HOSPADM

## 2023-04-26 RX ORDER — OXYCODONE AND ACETAMINOPHEN 7.5; 325 MG/1; MG/1
1 TABLET ORAL ONCE AS NEEDED
Status: DISCONTINUED | OUTPATIENT
Start: 2023-04-26 | End: 2023-04-26 | Stop reason: HOSPADM

## 2023-04-26 RX ORDER — OXYCODONE HYDROCHLORIDE 5 MG/1
5 TABLET ORAL EVERY 4 HOURS PRN
Qty: 3 TABLET | Refills: 0 | Status: SHIPPED | OUTPATIENT
Start: 2023-04-26

## 2023-04-26 RX ORDER — KETOROLAC TROMETHAMINE 30 MG/ML
INJECTION, SOLUTION INTRAMUSCULAR; INTRAVENOUS AS NEEDED
Status: DISCONTINUED | OUTPATIENT
Start: 2023-04-26 | End: 2023-04-26 | Stop reason: SURG

## 2023-04-26 RX ORDER — SODIUM CHLORIDE 0.9 % (FLUSH) 0.9 %
10 SYRINGE (ML) INJECTION AS NEEDED
Status: DISCONTINUED | OUTPATIENT
Start: 2023-04-26 | End: 2023-04-26 | Stop reason: HOSPADM

## 2023-04-26 RX ORDER — ONDANSETRON 2 MG/ML
4 INJECTION INTRAMUSCULAR; INTRAVENOUS ONCE AS NEEDED
Status: DISCONTINUED | OUTPATIENT
Start: 2023-04-26 | End: 2023-04-26 | Stop reason: HOSPADM

## 2023-04-26 RX ORDER — ONDANSETRON 2 MG/ML
4 INJECTION INTRAMUSCULAR; INTRAVENOUS EVERY 6 HOURS PRN
Status: DISCONTINUED | OUTPATIENT
Start: 2023-04-26 | End: 2023-04-26 | Stop reason: HOSPADM

## 2023-04-26 RX ORDER — ACETAMINOPHEN 500 MG
1000 TABLET ORAL ONCE
Status: COMPLETED | OUTPATIENT
Start: 2023-04-26 | End: 2023-04-26

## 2023-04-26 RX ORDER — PROMETHAZINE HYDROCHLORIDE 25 MG/1
25 SUPPOSITORY RECTAL ONCE AS NEEDED
Status: DISCONTINUED | OUTPATIENT
Start: 2023-04-26 | End: 2023-04-26 | Stop reason: HOSPADM

## 2023-04-26 RX ORDER — ONDANSETRON 2 MG/ML
INJECTION INTRAMUSCULAR; INTRAVENOUS AS NEEDED
Status: DISCONTINUED | OUTPATIENT
Start: 2023-04-26 | End: 2023-04-26 | Stop reason: SURG

## 2023-04-26 RX ORDER — MEPERIDINE HYDROCHLORIDE 25 MG/ML
12.5 INJECTION INTRAMUSCULAR; INTRAVENOUS; SUBCUTANEOUS
Status: DISCONTINUED | OUTPATIENT
Start: 2023-04-26 | End: 2023-04-26 | Stop reason: HOSPADM

## 2023-04-26 RX ORDER — KETAMINE HCL IN NACL, ISO-OSM 100MG/10ML
SYRINGE (ML) INJECTION AS NEEDED
Status: DISCONTINUED | OUTPATIENT
Start: 2023-04-26 | End: 2023-04-26 | Stop reason: SURG

## 2023-04-26 RX ORDER — MIDAZOLAM HYDROCHLORIDE 2 MG/2ML
2 INJECTION, SOLUTION INTRAMUSCULAR; INTRAVENOUS
Status: DISCONTINUED | OUTPATIENT
Start: 2023-04-26 | End: 2023-04-26 | Stop reason: HOSPADM

## 2023-04-26 RX ORDER — SODIUM CHLORIDE, SODIUM LACTATE, POTASSIUM CHLORIDE, CALCIUM CHLORIDE 600; 310; 30; 20 MG/100ML; MG/100ML; MG/100ML; MG/100ML
9 INJECTION, SOLUTION INTRAVENOUS CONTINUOUS PRN
Status: DISCONTINUED | OUTPATIENT
Start: 2023-04-26 | End: 2023-04-26 | Stop reason: HOSPADM

## 2023-04-26 RX ORDER — SODIUM CHLORIDE 0.9 % (FLUSH) 0.9 %
10 SYRINGE (ML) INJECTION EVERY 12 HOURS SCHEDULED
Status: DISCONTINUED | OUTPATIENT
Start: 2023-04-26 | End: 2023-04-26 | Stop reason: HOSPADM

## 2023-04-26 RX ORDER — SODIUM CHLORIDE, SODIUM LACTATE, POTASSIUM CHLORIDE, CALCIUM CHLORIDE 600; 310; 30; 20 MG/100ML; MG/100ML; MG/100ML; MG/100ML
150 INJECTION, SOLUTION INTRAVENOUS CONTINUOUS
Status: DISCONTINUED | OUTPATIENT
Start: 2023-04-26 | End: 2023-04-26 | Stop reason: HOSPADM

## 2023-04-26 RX ORDER — IBUPROFEN 600 MG/1
600 TABLET ORAL EVERY 6 HOURS PRN
Qty: 30 TABLET | Refills: 1 | Status: SHIPPED | OUTPATIENT
Start: 2023-04-26

## 2023-04-26 RX ORDER — ACETAMINOPHEN 325 MG/1
650 TABLET ORAL ONCE
Status: DISCONTINUED | OUTPATIENT
Start: 2023-04-26 | End: 2023-04-26 | Stop reason: HOSPADM

## 2023-04-26 RX ORDER — EPHEDRINE SULFATE 50 MG/ML
INJECTION, SOLUTION INTRAVENOUS AS NEEDED
Status: DISCONTINUED | OUTPATIENT
Start: 2023-04-26 | End: 2023-04-26 | Stop reason: SURG

## 2023-04-26 RX ORDER — ACETAMINOPHEN 325 MG/1
650 TABLET ORAL EVERY 4 HOURS PRN
Qty: 30 TABLET | Refills: 1 | Status: SHIPPED | OUTPATIENT
Start: 2023-04-26

## 2023-04-26 RX ORDER — PROPOFOL 10 MG/ML
VIAL (ML) INTRAVENOUS AS NEEDED
Status: DISCONTINUED | OUTPATIENT
Start: 2023-04-26 | End: 2023-04-26 | Stop reason: SURG

## 2023-04-26 RX ORDER — LIDOCAINE HYDROCHLORIDE 20 MG/ML
INJECTION, SOLUTION EPIDURAL; INFILTRATION; INTRACAUDAL; PERINEURAL AS NEEDED
Status: DISCONTINUED | OUTPATIENT
Start: 2023-04-26 | End: 2023-04-26 | Stop reason: SURG

## 2023-04-26 RX ORDER — PROMETHAZINE HYDROCHLORIDE 12.5 MG/1
25 TABLET ORAL ONCE AS NEEDED
Status: DISCONTINUED | OUTPATIENT
Start: 2023-04-26 | End: 2023-04-26 | Stop reason: HOSPADM

## 2023-04-26 RX ORDER — PROMETHAZINE HYDROCHLORIDE 12.5 MG/1
12.5 TABLET ORAL ONCE AS NEEDED
Status: DISCONTINUED | OUTPATIENT
Start: 2023-04-26 | End: 2023-04-26 | Stop reason: HOSPADM

## 2023-04-26 RX ORDER — DEXAMETHASONE SODIUM PHOSPHATE 4 MG/ML
INJECTION, SOLUTION INTRA-ARTICULAR; INTRALESIONAL; INTRAMUSCULAR; INTRAVENOUS; SOFT TISSUE AS NEEDED
Status: DISCONTINUED | OUTPATIENT
Start: 2023-04-26 | End: 2023-04-26 | Stop reason: SURG

## 2023-04-26 RX ORDER — SODIUM CHLORIDE 9 MG/ML
40 INJECTION, SOLUTION INTRAVENOUS AS NEEDED
Status: DISCONTINUED | OUTPATIENT
Start: 2023-04-26 | End: 2023-04-26 | Stop reason: HOSPADM

## 2023-04-26 RX ORDER — OXYCODONE HYDROCHLORIDE 5 MG/1
5 TABLET ORAL
Status: DISCONTINUED | OUTPATIENT
Start: 2023-04-26 | End: 2023-04-26 | Stop reason: HOSPADM

## 2023-04-26 RX ORDER — FENTANYL CITRATE 50 UG/ML
INJECTION, SOLUTION INTRAMUSCULAR; INTRAVENOUS AS NEEDED
Status: DISCONTINUED | OUTPATIENT
Start: 2023-04-26 | End: 2023-04-26 | Stop reason: SURG

## 2023-04-26 RX ADMIN — PROPOFOL 150 MG: 10 INJECTION, EMULSION INTRAVENOUS at 12:19

## 2023-04-26 RX ADMIN — EPHEDRINE SULFATE 10 MG: 50 INJECTION INTRAVENOUS at 12:36

## 2023-04-26 RX ADMIN — DEXMEDETOMIDINE HYDROCHLORIDE 20 MCG: 100 INJECTION, SOLUTION, CONCENTRATE INTRAVENOUS at 12:18

## 2023-04-26 RX ADMIN — LIDOCAINE HYDROCHLORIDE 50 MG: 20 INJECTION, SOLUTION EPIDURAL; INFILTRATION; INTRACAUDAL; PERINEURAL at 12:19

## 2023-04-26 RX ADMIN — ACETAMINOPHEN 1000 MG: 500 TABLET ORAL at 10:46

## 2023-04-26 RX ADMIN — MIDAZOLAM HYDROCHLORIDE 2 MG: 1 INJECTION, SOLUTION INTRAMUSCULAR; INTRAVENOUS at 12:06

## 2023-04-26 RX ADMIN — ONDANSETRON 4 MG: 2 INJECTION INTRAMUSCULAR; INTRAVENOUS at 12:34

## 2023-04-26 RX ADMIN — FENTANYL CITRATE 50 MCG: 50 INJECTION, SOLUTION INTRAMUSCULAR; INTRAVENOUS at 12:19

## 2023-04-26 RX ADMIN — Medication 20 MG: at 12:19

## 2023-04-26 RX ADMIN — SODIUM CHLORIDE, POTASSIUM CHLORIDE, SODIUM LACTATE AND CALCIUM CHLORIDE 9 ML/HR: 600; 310; 30; 20 INJECTION, SOLUTION INTRAVENOUS at 10:47

## 2023-04-26 RX ADMIN — KETOROLAC TROMETHAMINE 30 MG: 30 INJECTION, SOLUTION INTRAMUSCULAR; INTRAVENOUS at 12:38

## 2023-04-26 RX ADMIN — DEXAMETHASONE SODIUM PHOSPHATE 4 MG: 4 INJECTION, SOLUTION INTRA-ARTICULAR; INTRALESIONAL; INTRAMUSCULAR; INTRAVENOUS; SOFT TISSUE at 12:34

## 2023-04-26 RX ADMIN — FENTANYL CITRATE 50 MCG: 50 INJECTION, SOLUTION INTRAMUSCULAR; INTRAVENOUS at 12:38

## 2023-04-26 NOTE — ANESTHESIA PREPROCEDURE EVALUATION
Anesthesia Evaluation     no history of anesthetic complications:  NPO Solid Status: > 6 hours  NPO Liquid Status: > 6 hours           Airway   Mallampati: II  TM distance: >3 FB  Neck ROM: full  Dental - normal exam     Pulmonary - negative pulmonary ROS and normal exam   Cardiovascular - negative cardio ROS and normal exam        Neuro/Psych  (+) psychiatric history Anxiety and Depression,    GI/Hepatic/Renal/Endo - negative ROS     Musculoskeletal (-) negative ROS    Abdominal  - normal exam   Substance History - negative use     OB/GYN    (+) Pregnant (, Missed  at 6-8 weeks),         Other - negative ROS                       Anesthesia Plan    ASA 2     general     intravenous induction     Anesthetic plan, risks, benefits, and alternatives have been provided, discussed and informed consent has been obtained with: patient.    Plan discussed with CRNA.        CODE STATUS:

## 2023-04-26 NOTE — DISCHARGE INSTRUCTIONS
DR. ESCOBEDO'S POST-OPERATIVE GYN SURGERY PRECAUTIONS AND Answers to FAQS     NO SEX or tampons for ONE week.     NO TUB BATH or POOL for ONE week, shower only.       VAGINAL BLEEDING/SPOTTING may continue on and off over the next several weeks after surgery.  You should not be needing to change a pad frequently and it should not be heavy.  If you are not sure, it is persisting, or it is increasing, please call our office.     FEVER or CHILLS or NOT FEELING WELL: call our office.  If the office is closed, you need to be seen in acute care or ER.       SWELLING/EDEMA:  should slowly improve after surgery.  Your legs/ankles should be fairly similar in size.  A red, painful, hot, swollen leg (usually just one side) can be a sign of a blood clot and should be evaluated immediately.  Call our office.  If it is after hours or a weekend, you must be seen IMMEDIATELY IN THE ER.      WORK and SCHOOL TIME OFF: depends on your specific surgery type, surrounding circumstances, and your work insurance/school rules.  If you have questions, please call [facility] at [phone] (ext. [phone]).  Or email [facility] at [email or web address].  They will assist in required paperwork for you and/or family members.      Any other QUESTIONS or CONCERNS, please call [facility] at [phone].         DISCHARGE INSTRUCTIONS  SURGICAL / AMBULATORY  PROCEDURES      For your surgery you had:  General anesthesia (you may have a sore throat for the first 24 hours)  You may experience dizziness, drowsiness, or light-headedness for several hours following surgery/procedure.  Do not stay alone today or tonight.  Limit your activity for 24 hours.  Resume your diet slowly.  Follow whatever special dietary instructions you may have been given by your doctor.  You should not drive or operate machinery, drink alcohol, or sign legally binding documents for 24 hours or while you are taking pain medication.  Last dose of pain medication was given  at:   .  NOTIFY YOUR DOCTOR IF YOU EXPERIENCE ANY OF THE FOLLOWING:  Temperature greater than 101 degrees Fahrenheit  Shaking Chills  Redness or excessive drainage from incision  Nausea, vomiting and/or pain that is not controlled by prescribed medications  Increase in bleeding or bleeding that is excessive  Unable to urinate in 6 hours after surgery  If unable to reach your doctor, please go to the closest Emergency Room    You may shower or bathe  TOMORROW   .  Apply an ice pack 24-48 hours.  Medications per physician instructions as indicated on Discharge Medication Information Sheet.    SPECIAL INSTRUCTIONS:

## 2023-04-26 NOTE — OP NOTE
GYN Operative Note      Date of Service:  23     Pre-Operative Dx:   Missed  [O02.1] 8 weeks by LMP, 6 weeks by ultrasound    Postoperative dx:   Same as above    Procedure(s):  Suction D&C    Surgeon:  Nidia Pollard DO    Assistant:  None    Anesthesia:  General    EBL: 10 Mls    Findings:   Tissue consistent with products of conception    Specimens:  ID Type Source Tests Collected by Time   A (Not marked as sent) : Products of conception Tissue Products of Conception TISSUE PATHOLOGY EXAM Nidia Pollard DO 2023 1241       Procedure Details:  The patient was brought to the operating room where anesthesia was placed without difficulty and deemed to be adequate.  She was prepped and draped in a normal sterile fashion and placed in high lithotomy position.  I was then called into the room.  An exam under anesthesia was performed.  A speculum was placed in the vagina.  A single-tooth tenaculum was placed on the cervix.  The uterus sounded to 11 cm. The cervix was sequentially dilated to a size 10 Hegar easily.  A size 10 curved curette was then called for.  Pressure was tested to a maximum of 40 cmHg.  The suction curette was placed easily into the endometrial cavity.  Suction curettage was performed with tissue consistent with products of conception.  A gentle sharp currettage was performed with good uterine cry throughout.  The suction curette was replaced back into the endometrial cavity with production of only blood and clot.  No further tissue noted.  Tissue was sent for permanent pathology.  The single-tooth tenaculum was removed from the anterior lip the cervix and was noted to be hemostatic.  An exam under anesthesia revealed a small, firm uterus, with no active bleeding.     The patient tolerated the procedure well.  Instrument lap and needle counts were correct.  She received antibiotics prior to the procedure.  She is taken to recovery room in stable condition.        Complications:   None    Condition:  Good    Disposition:  PACU      Electronically signed by:  Nidia Pollard DO, 04/26/23, 12:47 PM EDT.

## 2023-04-26 NOTE — ANESTHESIA POSTPROCEDURE EVALUATION
Patient: Grisel Logan    Procedure Summary     Date: 23 Room / Location: [facility]  / [facility]    Anesthesia Start: 1213 Anesthesia Stop: 1304    Procedure: DILATATION AND CURETTAGE WITH SUCTION (Vagina) Diagnosis:       Missed       (Missed  [O02.1])    Surgeons: Nidia Pollard DO Provider: Ryne Solares MD    Anesthesia Type: general ASA Status: 2          Anesthesia Type: general    Vitals  Vitals Value Taken Time   /65 23 1343   Temp 36.5 °C (97.7 °F) 23 1258   Pulse 82 23 1346   Resp 14 23 1258   SpO2 100 % 23 1346   Vitals shown include unvalidated device data.        Post Anesthesia Care and Evaluation    Patient location during evaluation: PACU  Patient participation: complete - patient participated  Level of consciousness: awake and alert  Pain management: adequate    Airway patency: patent  Anesthetic complications: No anesthetic complications  PONV Status: none  Cardiovascular status: acceptable  Respiratory status: acceptable  Hydration status: acceptable    Comments: I or my partners:   - Performed the pre-anesthetic examination and evaluation  - Formulated and discussed the anesthetic plan  - Personally participated in the most demanding procedures of the anesthesia plan, including vascular access, placement of neuraxial and nerve blocks, induction, airway management, positioning, and emergence  - Ensured that all procedures of the anesthetic plan were performed by qualified personnel  - Monitored the course of the anesthetic at frequent intervals  - Remained physically present and available throughout the anesthetic for immediate diagnosis and treatment of emergencies  - Provided the necessary post-anesthetic care

## 2023-04-27 ENCOUNTER — OFFICE VISIT (OUTPATIENT)
Dept: OBSTETRICS AND GYNECOLOGY | Facility: CLINIC | Age: 28
End: 2023-04-27
Payer: COMMERCIAL

## 2023-04-27 VITALS — BODY MASS INDEX: 27.79 KG/M2 | DIASTOLIC BLOOD PRESSURE: 72 MMHG | SYSTOLIC BLOOD PRESSURE: 116 MMHG | WEIGHT: 167 LBS

## 2023-04-27 DIAGNOSIS — Z09 POSTOPERATIVE FOLLOW-UP: Primary | ICD-10-CM

## 2023-04-27 PROCEDURE — 99024 POSTOP FOLLOW-UP VISIT: CPT | Performed by: OBSTETRICS & GYNECOLOGY

## 2023-04-27 NOTE — PROGRESS NOTES
Post Operative Visit        Chief Complaint   Patient presents with   • Post-op     HPI:   Pain:  As long as stays on top of meds, no.  Taking tylenol and motrin only.   Vaginal bleeding:  yes, light  Vaginal discharge:  no  Fever/chills:  no  Good appetite:  yes  Normal bladder function:  yes  Normal bowel function:  yes  Hot flashes: no    Op Note by Nidia Pollard DO (04/26/2023 12:37)     Pt had surgery yesterday, being seen early due to travel to [place] soon.  Coping well w loss. On zoloft.  Has support.     PHYSICAL EXAM:  /72   Wt 75.8 kg (167 lb)   LMP 02/23/2023   Breastfeeding No   BMI 27.79 kg/m²   General- NAD, alert and oriented, appropriate  Psych- Normal mood, good memory    ASSESSMENT and PLAN:  Post-operative exam    Diagnoses and all orders for this visit:    1. Postoperative follow-up (Primary)        Operative report, surgical findings and any pathology/photos reviewed.  All questions answered.     Counseling:   • Okay to resume intercourse 1 week after surgery.  Patient traveling to [place], recommend baby aspirin and DVT precautions on the long flight.  We discussed the option of resuming birth control versus trying to conceive.  We also talked about the option for adjusting her medications, specifically her Zoloft.  She denies a change in her medications right now and is considering a possible IUD when she returns from [place].  If she desires an IUD I recommend she remain abstinent until it is placed or at least abstinent for at least 2 weeks with a negative beta-hCG prior to her IUD placement.      Follow Up:  Return in about 26 days (around 5/23/2023) for Keep FU as scheduled, possibly discuss BC/IUD.            Nidia Pollard DO  04/27/2023    [facility] [place] [facility]  [facility] [place] [address]  Dept: [phone]  Dept Fax: [phone]  Loc: [phone]  Loc Fax: [phone]

## 2023-04-28 LAB
CYTO UR: NORMAL
LAB AP CASE REPORT: NORMAL
LAB AP CLINICAL INFORMATION: NORMAL
PATH REPORT.FINAL DX SPEC: NORMAL
PATH REPORT.GROSS SPEC: NORMAL

## 2023-05-22 PROBLEM — O02.1 MISSED ABORTION: Status: RESOLVED | Noted: 2023-04-24 | Resolved: 2023-05-22

## 2023-05-22 NOTE — PROGRESS NOTES
Post Operative Visit        Chief Complaint   Patient presents with   • Post-op     DILATATION AND CURETTAGE WITH SUCTION     HPI:   Pain:  no  Vaginal bleeding:  no  Vaginal discharge:  no  Fever/chills:  no  Good appetite:  yes  Normal bladder function:  yes  Normal bowel function:  yes  Hot flashes: yes, during cycles     Tissue Pathology Exam (04/26/2023 12:41)   Op Note by Nidia Pollard DO (04/26/2023 12:37)     Coping well, has support, PCP managing Zoloft.  Declines BC for now.    PHYSICAL EXAM:  /77   Pulse 84   Wt 74.8 kg (165 lb)   LMP 04/28/2023 Comment: Normal flow, lasted 7 days  Breastfeeding No   BMI 27.46 kg/m²   General- NAD, alert and oriented, appropriate  Psych- Normal mood, good memory    ASSESSMENT and PLAN:  Post-operative exam    Diagnoses and all orders for this visit:    1. Postoperative follow-up (Primary)    2. LGSIL of cervix of undetermined significance  Overview:  May 2022 LGSIL, HPV neg- first abn pap.  Recommend repeat pap and cotest 1year      Operative report, surgical findings and any pathology/photos reviewed.  All questions answered.     Counseling:   • Ok to resume normal activities      Follow Up:  Return in about 2 months (around 7/23/2023) for WWE, hx LGSIL, for pap and cotest HPV.            Nidia Pollard DO  05/23/2023    [facility]  [address] [place] [address] [address] [place] [address]  Dept: [phone]  Dept Fax: [phone]  Loc: [phone]  Loc Fax: [phone]

## 2023-05-23 ENCOUNTER — OFFICE VISIT (OUTPATIENT)
Dept: OBSTETRICS AND GYNECOLOGY | Facility: CLINIC | Age: 28
End: 2023-05-23
Payer: COMMERCIAL

## 2023-05-23 VITALS
HEART RATE: 84 BPM | WEIGHT: 165 LBS | DIASTOLIC BLOOD PRESSURE: 77 MMHG | SYSTOLIC BLOOD PRESSURE: 113 MMHG | BODY MASS INDEX: 27.46 KG/M2

## 2023-05-23 DIAGNOSIS — Z09 POSTOPERATIVE FOLLOW-UP: Primary | ICD-10-CM

## 2023-05-23 DIAGNOSIS — R87.612 LGSIL OF CERVIX OF UNDETERMINED SIGNIFICANCE: ICD-10-CM

## 2023-05-23 PROCEDURE — 99024 POSTOP FOLLOW-UP VISIT: CPT | Performed by: OBSTETRICS & GYNECOLOGY

## 2023-07-18 PROBLEM — N81.2 INCOMPLETE UTEROVAGINAL PROLAPSE: Status: RESOLVED | Noted: 2022-12-29 | Resolved: 2023-07-18

## 2023-07-18 PROBLEM — B07.9 VIRAL WARTS: Status: RESOLVED | Noted: 2021-06-23 | Resolved: 2023-07-18

## 2023-07-18 PROBLEM — Z34.80 SUPERVISION OF OTHER NORMAL PREGNANCY, ANTEPARTUM: Status: ACTIVE | Noted: 2023-07-18

## 2023-08-21 ENCOUNTER — INITIAL PRENATAL (OUTPATIENT)
Dept: OBSTETRICS AND GYNECOLOGY | Facility: CLINIC | Age: 28
End: 2023-08-21
Payer: COMMERCIAL

## 2023-08-21 ENCOUNTER — LAB (OUTPATIENT)
Dept: OBSTETRICS AND GYNECOLOGY | Facility: CLINIC | Age: 28
End: 2023-08-21
Payer: COMMERCIAL

## 2023-08-21 VITALS — BODY MASS INDEX: 28.72 KG/M2 | SYSTOLIC BLOOD PRESSURE: 124 MMHG | WEIGHT: 172.6 LBS | DIASTOLIC BLOOD PRESSURE: 72 MMHG

## 2023-08-21 DIAGNOSIS — Z34.80 SUPERVISION OF OTHER NORMAL PREGNANCY, ANTEPARTUM: Primary | ICD-10-CM

## 2023-08-21 LAB
ABO GROUP BLD: NORMAL
BASOPHILS # BLD AUTO: 0.02 10*3/MM3 (ref 0–0.2)
BASOPHILS NFR BLD AUTO: 0.2 % (ref 0–1.5)
BLD GP AB SCN SERPL QL: NEGATIVE
DEPRECATED RDW RBC AUTO: 40.1 FL (ref 37–54)
EOSINOPHIL # BLD AUTO: 0.06 10*3/MM3 (ref 0–0.4)
EOSINOPHIL NFR BLD AUTO: 0.7 % (ref 0.3–6.2)
ERYTHROCYTE [DISTWIDTH] IN BLOOD BY AUTOMATED COUNT: 12.6 % (ref 12.3–15.4)
GLUCOSE UR STRIP-MCNC: NEGATIVE MG/DL
HBV SURFACE AG SERPL QL IA: NORMAL
HCT VFR BLD AUTO: 40.6 % (ref 34–46.6)
HCV AB SER DONR QL: NORMAL
HGB BLD-MCNC: 13.4 G/DL (ref 12–15.9)
HIV1+2 AB SER QL: NORMAL
IMM GRANULOCYTES # BLD AUTO: 0.03 10*3/MM3 (ref 0–0.05)
IMM GRANULOCYTES NFR BLD AUTO: 0.4 % (ref 0–0.5)
LYMPHOCYTES # BLD AUTO: 1.4 10*3/MM3 (ref 0.7–3.1)
LYMPHOCYTES NFR BLD AUTO: 16.9 % (ref 19.6–45.3)
MCH RBC QN AUTO: 29 PG (ref 26.6–33)
MCHC RBC AUTO-ENTMCNC: 33 G/DL (ref 31.5–35.7)
MCV RBC AUTO: 87.9 FL (ref 79–97)
MONOCYTES # BLD AUTO: 0.36 10*3/MM3 (ref 0.1–0.9)
MONOCYTES NFR BLD AUTO: 4.4 % (ref 5–12)
NEUTROPHILS NFR BLD AUTO: 6.39 10*3/MM3 (ref 1.7–7)
NEUTROPHILS NFR BLD AUTO: 77.4 % (ref 42.7–76)
NRBC BLD AUTO-RTO: 0 /100 WBC (ref 0–0.2)
PLATELET # BLD AUTO: 311 10*3/MM3 (ref 140–450)
PMV BLD AUTO: 9.5 FL (ref 6–12)
PROT UR STRIP-MCNC: NEGATIVE MG/DL
RBC # BLD AUTO: 4.62 10*6/MM3 (ref 3.77–5.28)
RH BLD: POSITIVE
T PALLIDUM IGG SER QL: NORMAL
WBC NRBC COR # BLD: 8.26 10*3/MM3 (ref 3.4–10.8)

## 2023-08-21 PROCEDURE — 86900 BLOOD TYPING SEROLOGIC ABO: CPT | Performed by: NURSE PRACTITIONER

## 2023-08-21 PROCEDURE — G0123 SCREEN CERV/VAG THIN LAYER: HCPCS

## 2023-08-21 PROCEDURE — 87661 TRICHOMONAS VAGINALIS AMPLIF: CPT

## 2023-08-21 PROCEDURE — 87624 HPV HI-RISK TYP POOLED RSLT: CPT

## 2023-08-21 PROCEDURE — 86901 BLOOD TYPING SEROLOGIC RH(D): CPT | Performed by: NURSE PRACTITIONER

## 2023-08-21 PROCEDURE — G0432 EIA HIV-1/HIV-2 SCREEN: HCPCS | Performed by: NURSE PRACTITIONER

## 2023-08-21 PROCEDURE — 86850 RBC ANTIBODY SCREEN: CPT | Performed by: NURSE PRACTITIONER

## 2023-08-21 PROCEDURE — 87591 N.GONORRHOEAE DNA AMP PROB: CPT

## 2023-08-21 PROCEDURE — 85025 COMPLETE CBC W/AUTO DIFF WBC: CPT | Performed by: NURSE PRACTITIONER

## 2023-08-21 PROCEDURE — 86780 TREPONEMA PALLIDUM: CPT | Performed by: NURSE PRACTITIONER

## 2023-08-21 PROCEDURE — 36415 COLL VENOUS BLD VENIPUNCTURE: CPT | Performed by: NURSE PRACTITIONER

## 2023-08-21 PROCEDURE — 87086 URINE CULTURE/COLONY COUNT: CPT | Performed by: NURSE PRACTITIONER

## 2023-08-21 PROCEDURE — 86803 HEPATITIS C AB TEST: CPT | Performed by: NURSE PRACTITIONER

## 2023-08-21 PROCEDURE — 87491 CHLMYD TRACH DNA AMP PROBE: CPT

## 2023-08-21 PROCEDURE — 0501F PRENATAL FLOW SHEET: CPT | Performed by: NURSE PRACTITIONER

## 2023-08-21 PROCEDURE — 87340 HEPATITIS B SURFACE AG IA: CPT | Performed by: NURSE PRACTITIONER

## 2023-08-21 RX ORDER — FAMOTIDINE 20 MG/1
20 TABLET, FILM COATED ORAL 2 TIMES DAILY PRN
Qty: 60 TABLET | Refills: 5 | Status: SHIPPED | OUTPATIENT
Start: 2023-08-21 | End: 2024-02-17

## 2023-08-21 NOTE — PROGRESS NOTES
OB Initial Visit    CC- Here for care of current pregnancy, first visit    Subjective:  28 y.o.  presenting for her first obstetrical visit.    LMP: Patient's last menstrual period was 2023.     Heartburn, using TUMS      Reviewed and updated:  OBHx, GYNHx (STDs), PMHx, Medications, Allergies, PSHx, Social Hx, Preventative Hx (PAP), Hx of abuse/safe environment, Vaccine Hx including hx of chickenpox or vaccine, Genetic Hx (pt, FOB, both families).        Objective:  /72   Wt 78.3 kg (172 lb 9.6 oz)   LMP 2023   BMI 28.72 kg/mý           General- NAD, alert and oriented, appropriate  Psych- Normal mood, good memory  Neck- No masses, no thyroid enlargement  CV- Regular rhythm, no murnurs  Resp- CTA to bases, no wheezes  Abdomen- Soft, non distended, non tender, no masses    Breast left- deferred  Breast right- deferred    External genitalia- Normal, no lesions  Urethra- Normal, no masses, non tender  Vagina- Normal, no discharge  Bladder- Normal, no masses, non tender  Cvx- Normal, no lesions, no discharge, no CMT  Uterus- Normal shape and consistency, non tender, Consistent with dates, Bedside US consistent with dates.  -160..    Adnexa- Normal, no mass, non tender    Lymphatic- No palpable neck, axillary, or groin nodes  Ext- No edema, no cyanosis    Skin- No lesions, no rashes, no acanthosis nigricans    Assessment and Plan:  12w0d  Diagnoses and all orders for this visit:    1. Supervision of other normal pregnancy, antepartum (Primary)  Overview:  BLAIRE finalized: 3/4/2024 by LMP and 7-week ultrasound    Genetic testing (NIPS-Quad)/CF/AFP:  NIPS pending    COVID: Fully vaccinated  Flu: Recommended 23  Tdap:    Rhogam:  ? Desires Sterilization:    Anatomy US:  FU US:    PROBLEM LIST/PLAN:   Anxiety/depression-Zoloft 100 mg  Asthma-no medications  Family history of congenital heart defect  Plan anatomy ultrasound with MFM, fetal echo  May 2022 LGSIL, HPV neg- first abn pap.  Recommend repeat pap and cotest w new OB OV        Heartburn - will try famotidine         Assessment & Plan:  Doing well, complains of heartburn  Will try famotidine  Precautions reviewed      Orders:  -     POC Urinalysis Dipstick  -     IGP,CtNgTv,Apt HPV,rfx 16 / 18,45  -     OB Panel With HIV  -     Urine Culture - Urine, Urine, Random Void  -     Cancel: Hemoglobinopathy Fractionation Cascade  -     ItinrgxL45 PLUS Core+SCA+ESS - Blood,  -     famotidine (PEPCID) 20 MG tablet; Take 1 tablet by mouth 2 (Two) Times a Day As Needed for Heartburn for up to 180 days.  Dispense: 60 tablet; Refill: 5        Genetic Screening:   NIPS    Vaccines:   Recommend FLU vaccine this season, R/B discussed  s/p COVID vaccine    Counseling:   Nutrition discussed, calories, activity/exercise in pregnancy  Discussed dietary restrictions/safety food preparation in pregnancy  Reviewed what to expect prenatal visits, office providers and [facility] hospitalists  Appropriate trimester precautions provided, N/V, vag bleeding, cramping  Questions answered    Labs:   Prenatal labs, cultures, and PAP performed (prn), NIPS    Return in about 4 weeks (around 9/18/2023) for [facility], OB follow up.      Marino Iniguez, APRN  08/21/2023    [facility] [place] [facility] [address]  [facility]  [address] [place] [address]  Dept: [phone]  Dept Fax: [phone]  Loc: [phone]

## 2023-08-22 LAB — BACTERIA SPEC AEROBE CULT: NO GROWTH

## 2023-08-23 LAB — RUBV IGG SERPL IA-ACNC: 1.5 INDEX

## 2023-08-24 LAB
C TRACH RRNA CVX QL NAA+PROBE: NEGATIVE
CYTOLOGIST CVX/VAG CYTO: NORMAL
CYTOLOGY CVX/VAG DOC CYTO: NORMAL
CYTOLOGY CVX/VAG DOC THIN PREP: NORMAL
DX ICD CODE: NORMAL
HIV 1 & 2 AB SER-IMP: NORMAL
HPV GENOTYPE REFLEX: NORMAL
HPV I/H RISK 4 DNA CVX QL PROBE+SIG AMP: NEGATIVE
N GONORRHOEA RRNA CVX QL NAA+PROBE: NEGATIVE
OTHER STN SPEC: NORMAL
STAT OF ADQ CVX/VAG CYTO-IMP: NORMAL
T VAGINALIS RRNA SPEC QL NAA+PROBE: NEGATIVE

## 2023-08-28 ENCOUNTER — TELEPHONE (OUTPATIENT)
Dept: OBSTETRICS AND GYNECOLOGY | Facility: CLINIC | Age: 28
End: 2023-08-28
Payer: COMMERCIAL

## 2023-08-28 NOTE — TELEPHONE ENCOUNTER
----- Message from KENYA Downing sent at 8/28/2023  8:29 AM EDT -----  Please let patient know her NIPS screen is negative, fetus is male if she wants to know gender.

## 2023-08-28 NOTE — TELEPHONE ENCOUNTER
"Called patient left message NIPS screen negative (normal) if patient would like to know gender results can view in my chart or call office back to go over results on phone Gender is Male. 'HUB TO READ\"  "

## 2023-09-14 NOTE — PROGRESS NOTES
OB FOLLOW UP      Chief Complaint   Patient presents with    FU OB     Subjective:   No complaints    Objective:  /67   Wt 76.7 kg (169 lb)   LMP 05/29/2023   BMI 28.12 kg/m²  0.907 kg (2 lb) Facility age limit for growth percentiles is 20 years.  See OB flow sheet for fundal height (not performed if US day of OV), FHT, edema, cvx exam if performed, and Upro/Uglu      Assessment and Plan:  16w1d   Reassuring pregnancy progress.  Questions answered.  Diagnoses and all orders for this visit:    1. Supervision of other normal pregnancy, antepartum (Primary)  Overview:  BLAIRE finalized: 3/4/2024 by LMP and 7-week ultrasound    Genetic testing (NIPS-Quad)/CF/AFP:  NIPS negative.  9/19/2023 Considering CF and AFP    COVID: Fully vaccinated  Flu: Recommended   Tdap:    ? Desires Sterilization:    Anatomy US:  FU US:    PROBLEM LIST/PLAN:   Anxiety/depression-Zoloft 100 mg.  9/19/2023 doing well  Family history of congenital heart defect  Plan anatomy ultrasound with MFM, fetal echo.  Ordered 9/19/2023     Asthma, childhood-no medications  May 2022 LGSIL, HPV neg- first abn pap. Aug 2023 Pap neg HPV neg. Recommend repeat pap/cotest 3yrs per [facility]           Orders:  -     POC Urinalysis Dipstick    2. Family history of congenital heart defect  -     Ambulatory Referral to MFM/Perinatology      Counseling:    Second trimester precautions    Continue PNV.  Importance of healthy eating, obtaining sufficient sleep, and staying active unless hypertensive- activity modified as directed.    Return in about 4 weeks (around 10/17/2023) for FU OB x2.            Nidia Pollard,   09/19/2023    [facility] [place]  [address] [place] [address]  Dept: [phone]  Dept Fax: [phone]  Loc: [phone]  Loc Fax: [phone]

## 2023-09-19 ENCOUNTER — ROUTINE PRENATAL (OUTPATIENT)
Dept: OBSTETRICS AND GYNECOLOGY | Facility: CLINIC | Age: 28
End: 2023-09-19
Payer: COMMERCIAL

## 2023-09-19 VITALS — SYSTOLIC BLOOD PRESSURE: 112 MMHG | BODY MASS INDEX: 28.12 KG/M2 | WEIGHT: 169 LBS | DIASTOLIC BLOOD PRESSURE: 67 MMHG

## 2023-09-19 DIAGNOSIS — Z82.79 FAMILY HISTORY OF CONGENITAL HEART DEFECT: ICD-10-CM

## 2023-09-19 DIAGNOSIS — Z34.80 SUPERVISION OF OTHER NORMAL PREGNANCY, ANTEPARTUM: Primary | ICD-10-CM

## 2023-09-19 LAB
GLUCOSE UR STRIP-MCNC: NEGATIVE MG/DL
PROT UR STRIP-MCNC: NEGATIVE MG/DL

## 2023-09-19 RX ORDER — FOLIC ACID 1 MG/1
1 TABLET ORAL DAILY
COMMUNITY

## 2023-09-22 DIAGNOSIS — Z82.79 FAMILY HISTORY OF CONGENITAL HEART DEFECT: Primary | ICD-10-CM

## 2023-09-27 ENCOUNTER — TELEPHONE (OUTPATIENT)
Dept: OBSTETRICS AND GYNECOLOGY | Facility: CLINIC | Age: 28
End: 2023-09-27
Payer: COMMERCIAL

## 2023-09-27 DIAGNOSIS — U07.1 COVID-19 AFFECTING PREGNANCY, ANTEPARTUM: Primary | ICD-10-CM

## 2023-09-27 DIAGNOSIS — O98.519 COVID-19 AFFECTING PREGNANCY, ANTEPARTUM: Primary | ICD-10-CM

## 2023-09-27 RX ORDER — ASPIRIN 81 MG/1
81 TABLET ORAL DAILY
Qty: 90 TABLET | Refills: 1 | Status: SHIPPED | OUTPATIENT
Start: 2023-09-27

## 2023-09-27 NOTE — TELEPHONE ENCOUNTER
Recommend she treat her symptoms as needed, ED if develops trouble breathing or other severe symptoms.  Recommend she start ASA 81 mg daily, will send to pharmacy.

## 2023-09-27 NOTE — TELEPHONE ENCOUNTER
Covid + today got tested at work yesterday for flu, strep and covid. Advised keep pushing fluids and to go to ER if symptoms get worse symptoms started on 09/23/2023.

## 2023-10-09 NOTE — PROGRESS NOTES
MATERNAL FETAL MEDICINE Consult Note    Dear Dr Nidia Pollard DO:    Thank you for your kind referral of Grisel Logan.  As you know, she is a 28 y.o.   at  19 1/7 weeks gestation (Estimated Date of Delivery: 3/4/24). This is a consult.      Her antepartum course is complicated by:  CHD in baby's uncle    Aneuploidy Screening: low risk    HPI: Today, she denies headache, blurry vision, RUQ pain. No vaginal bleeding, no contractions.     Review of History:  Past Medical History:   Diagnosis Date    Anxiety     Depression     Incomplete uterovaginal prolapse 2022    Missed  2023    Added automatically from request for surgery [id number]    Scoliosis 2014    Viral warts 2021    Formatting of this note might be different from the original.  SAL ACID PLASTER     Past Surgical History:   Procedure Laterality Date    CYST REMOVAL      bronchial cleft cyst    D & C WITH SUCTION N/A 2023    Procedure: DILATATION AND CURETTAGE WITH SUCTION;  Surgeon: Nidia Pollard DO;  [place] [facility] [place] [facility] [place]  Service: Gynecology;  Laterality: N/A;    WISDOM TOOTH EXTRACTION           Social History     Socioeconomic History    Marital status:     Number of children: 1   Tobacco Use    Smoking status: Never    Smokeless tobacco: Never   Vaping Use    Vaping Use: Never used   Substance and Sexual Activity    Alcohol use: Not Currently     Comment: social    Drug use: Not Currently    Sexual activity: Yes     Partners: Male     Birth control/protection: None     Family History   Problem Relation Age of Onset    Hypertension Father     Hypertension Mother     Prostate cancer Paternal Grandfather     Throat cancer Paternal Grandfather     Colon cancer Paternal Grandmother     Hypertension Paternal Grandmother     Stroke Paternal Grandmother     Clotting disorder Paternal Grandmother     Cancer Maternal Grandmother         Pt unsure    Clotting disorder Maternal Grandmother     Diabetes Maternal  "Grandfather     Hypertension Maternal Grandfather     Stomach cancer Maternal Grandfather     Lung cancer Maternal Grandfather     Lung cancer Maternal Aunt     Breast cancer Maternal Aunt     Diabetes Maternal Great-Grandfather     Ovarian cancer Neg Hx     Uterine cancer Neg Hx       No Known Allergies   Current Outpatient Medications on File Prior to Visit   Medication Sig Dispense Refill    aspirin 81 MG EC tablet Take 1 tablet by mouth Daily. Start daily after 12weeks gestation and continue until 7-10 days before expected delivery 90 tablet 1    folic acid (FOLVITE) 1 MG tablet Take 1 tablet by mouth Daily.      prenatal vitamin (prenatal, CLASSIC, vitamin) tablet Take 1 tablet by mouth Daily.      sertraline (ZOLOFT) 100 MG tablet Take 1 tablet by mouth Daily.      famotidine (PEPCID) 20 MG tablet Take 1 tablet by mouth 2 (Two) Times a Day As Needed for Heartburn for up to 180 days. (Patient not taking: Reported on 10/10/2023) 60 tablet 5    Valtrex 1 g tablet Take 1 tablet by mouth Daily. (Patient not taking: Reported on 10/10/2023)       No current facility-administered medications on file prior to visit.        Past obstetric, gynecological, medical, surgical, family and social history reviewed.  Relevant lab work and imaging reviewed.    Review of systems  Constitutional:  denies fever, chills, malaise.   ENT/Mouth:  denies sore throat, tinnitus  Eyes: denies vision changes/pain  CV:  denies chest pain  Respiratory:  denies cough/SOB  GI:  denies N/V, diarrhea, abdominal pain.    :   denies dysuria  Skin:  denies lesions or pruritus   Neuro:  denies weakness, focal neurologic symptoms    Vitals:    10/10/23 1305   BP: 109/52   BP Location: Right arm   Patient Position: Sitting   Pulse: 71   Temp: 99.6 øF (37.6 øC)   TempSrc: Temporal   Weight: 77.3 kg (170 lb 6.4 oz)   Height: 162.6 cm (64\")       PHYSICAL EXAM   GENERAL: Not in acute distress, AAOx3, pleasant  CARDIO: regular rate and rhythm  PULM: " symmetric chest rise, speaking in complete sentences without difficulty  NEURO: awake, alert and oriented to person, place, and time  ABDOMINAL: No fundal tenderness, no rebound or guarding, gravid  EXTREMITIES: no bilateral lower extremity edema/tenderness  SKIN: Warm, well-perfused      ULTRASOUND   Please view full ultrasound note on Imaging tab in ViewPoint.  Cephalic presentation.  Anterior placenta.   MVP 6.5 cm, which is normal.     g (AC 51%)  Anatomy appears normal.   Cervical length transabdominally >3.5 cm, which is normal.      ASSESSMENT/COUNSELIN y.o.   at  19 1/7 weeks gestation (Estimated Date of Delivery: 3/4/24).     -Pregnancy  [ X ] stable  [   ] improving [  ] worsening    Diagnoses and all orders for this visit:    1. Family history of congenital heart defect (Primary)       Family history (uncle with hypoplastic L heart)   FOB with single kidney and club feet (neither seen today on baby but discussed limitations of US)  Congenital heart defects may occur due to multifactorial influences, chromosomal abnormalities, genetic syndromes or environmental exposures.  Isolated heart defects are generally multifactorial.  The overall prognosis is dependent on the severity of the heart defect, and whether or not it is due to an underlying chromosome or genetic problem.  Chromosomal and syndromic etiologies may be associated with other birth defects and mental delay.  Risk for recurrence depends on etiology. With a positive family history in a 2nd degree relative, the risk of this baby having a heart defect is around 1 percent, which is around the baseline risk.  Thus, a fetal ECHO is not indicated. We do not see any evidence of cardiac defect today.     Summary of Plan  -Routine prenatal care    Follow-up: No follow up with MFM scheduled, but I am happy to see for follow up at request of primary obstetrician    Thank you for the consult and opportunity to care for this patient.   Please feel free to reach out with any questions or concerns.      I spent 20 minutes caring for this patient on this date of service. This time includes time spent by me in the following activities: preparing for the visit, reviewing tests, obtaining and/or reviewing a separately obtained history, performing a medically appropriate examination and/or evaluation, counseling and educating the patient/family/caregiver and independently interpreting results and communicating that information with the patient/family/caregiver with greater than 50% spent in counseling and coordination of care.       I spent 5 minutes on the separately reported service of US imaging not included in the time used to support the E/M service also reported today.      Brittany Wray MD FACOG  Maternal Fetal Medicine-[facility]  Office: [phone]  [email or web address]

## 2023-10-10 ENCOUNTER — HOSPITAL ENCOUNTER (OUTPATIENT)
Dept: ULTRASOUND IMAGING | Facility: HOSPITAL | Age: 28
Discharge: HOME | End: 2023-10-10
Admitting: NURSE PRACTITIONER
Payer: COMMERCIAL

## 2023-10-10 ENCOUNTER — OFFICE VISIT (OUTPATIENT)
Dept: OBSTETRICS AND GYNECOLOGY | Facility: CLINIC | Age: 28
End: 2023-10-10
Payer: COMMERCIAL

## 2023-10-10 VITALS
DIASTOLIC BLOOD PRESSURE: 52 MMHG | TEMPERATURE: 99.6 F | HEIGHT: 64 IN | HEART RATE: 71 BPM | BODY MASS INDEX: 29.09 KG/M2 | SYSTOLIC BLOOD PRESSURE: 109 MMHG | WEIGHT: 170.4 LBS

## 2023-10-10 DIAGNOSIS — Z82.79 FAMILY HISTORY OF CONGENITAL HEART DEFECT: Primary | ICD-10-CM

## 2023-10-10 DIAGNOSIS — Z82.79 FAMILY HISTORY OF CONGENITAL HEART DEFECT: ICD-10-CM

## 2023-10-10 PROCEDURE — 76811 OB US DETAILED SNGL FETUS: CPT

## 2023-10-10 RX ORDER — PRENATAL VIT NO.126/IRON/FOLIC 28MG-0.8MG
1 TABLET ORAL DAILY
COMMUNITY

## 2023-10-10 RX ORDER — VALACYCLOVIR HCL 1000 MG
1000 TABLET ORAL DAILY
COMMUNITY
Start: 2023-10-02 | End: 2023-11-01

## 2023-10-10 NOTE — LETTER
October 10, 2023     Nidia Pollard DO  [address] [place] [address]    Patient: Grisel Logan   YOB: 1995   Date of Visit: 10/10/2023       Dear Nidia Pollard DO,    Thank you for referring Grisel Logan to me for evaluation. Below is a copy of my consult note.    If you have questions, please do not hesitate to call me. I look forward to following Grisel along with you.         Sincerely,        Brittany Wray MD    MATERNAL FETAL MEDICINE Consult Note    Dear Dr Nidia Pollard DO:    Thank you for your kind referral of Grisel Logan.  As you know, she is a 28 y.o.   at  19 1/7 weeks gestation (Estimated Date of Delivery: 3/4/24). This is a consult.      Her antepartum course is complicated by:  CHD in baby's uncle    Aneuploidy Screening: low risk    HPI: Today, she denies headache, blurry vision, RUQ pain. No vaginal bleeding, no contractions.     Review of History:  Past Medical History:   Diagnosis Date    Anxiety     Depression     Incomplete uterovaginal prolapse 2022    Missed  2023    Added automatically from request for surgery [id number]    Scoliosis 2014    Viral warts 2021    Formatting of this note might be different from the original.  SAL ACID PLASTER     Past Surgical History:   Procedure Laterality Date    CYST REMOVAL      bronchial cleft cyst    D & C WITH SUCTION N/A 2023    Procedure: DILATATION AND CURETTAGE WITH SUCTION;  Surgeon: Nidia Pollard DO;  [place] [facility] [place] [facility] [place]  Service: Gynecology;  Laterality: N/A;    WISDOM TOOTH EXTRACTION           Social History     Socioeconomic History    Marital status:     Number of children: 1   Tobacco Use    Smoking status: Never    Smokeless tobacco: Never   Vaping Use    Vaping Use: Never used   Substance and Sexual Activity    Alcohol use: Not Currently     Comment: social    Drug use: Not Currently    Sexual activity: Yes     Partners: Male     Birth  control/protection: None     Family History   Problem Relation Age of Onset    Hypertension Father     Hypertension Mother     Prostate cancer Paternal Grandfather     Throat cancer Paternal Grandfather     Colon cancer Paternal Grandmother     Hypertension Paternal Grandmother     Stroke Paternal Grandmother     Clotting disorder Paternal Grandmother     Cancer Maternal Grandmother         Pt unsure    Clotting disorder Maternal Grandmother     Diabetes Maternal Grandfather     Hypertension Maternal Grandfather     Stomach cancer Maternal Grandfather     Lung cancer Maternal Grandfather     Lung cancer Maternal Aunt     Breast cancer Maternal Aunt     Diabetes Maternal Great-Grandfather     Ovarian cancer Neg Hx     Uterine cancer Neg Hx       No Known Allergies   Current Outpatient Medications on File Prior to Visit   Medication Sig Dispense Refill    aspirin 81 MG EC tablet Take 1 tablet by mouth Daily. Start daily after 12weeks gestation and continue until 7-10 days before expected delivery 90 tablet 1    folic acid (FOLVITE) 1 MG tablet Take 1 tablet by mouth Daily.      prenatal vitamin (prenatal, CLASSIC, vitamin) tablet Take 1 tablet by mouth Daily.      sertraline (ZOLOFT) 100 MG tablet Take 1 tablet by mouth Daily.      famotidine (PEPCID) 20 MG tablet Take 1 tablet by mouth 2 (Two) Times a Day As Needed for Heartburn for up to 180 days. (Patient not taking: Reported on 10/10/2023) 60 tablet 5    Valtrex 1 g tablet Take 1 tablet by mouth Daily. (Patient not taking: Reported on 10/10/2023)       No current facility-administered medications on file prior to visit.        Past obstetric, gynecological, medical, surgical, family and social history reviewed.  Relevant lab work and imaging reviewed.    Review of systems  Constitutional:  denies fever, chills, malaise.   ENT/Mouth:  denies sore throat, tinnitus  Eyes: denies vision changes/pain  CV:  denies chest pain  Respiratory:   "denies cough/SOB  GI:  denies N/V, diarrhea, abdominal pain.    :   denies dysuria  Skin:  denies lesions or pruritus   Neuro:  denies weakness, focal neurologic symptoms    Vitals:    10/10/23 1305   BP: 109/52   BP Location: Right arm   Patient Position: Sitting   Pulse: 71   Temp: 99.6 øF (37.6 øC)   TempSrc: Temporal   Weight: 77.3 kg (170 lb 6.4 oz)   Height: 162.6 cm (64\")       PHYSICAL EXAM   GENERAL: Not in acute distress, AAOx3, pleasant  CARDIO: regular rate and rhythm  PULM: symmetric chest rise, speaking in complete sentences without difficulty  NEURO: awake, alert and oriented to person, place, and time  ABDOMINAL: No fundal tenderness, no rebound or guarding, gravid  EXTREMITIES: no bilateral lower extremity edema/tenderness  SKIN: Warm, well-perfused      ULTRASOUND   Please view full ultrasound note on Imaging tab in ViewPoint.  Cephalic presentation.  Anterior placenta.   MVP 6.5 cm, which is normal.     g (AC 51%)  Anatomy appears normal.   Cervical length transabdominally >3.5 cm, which is normal.      ASSESSMENT/COUNSELIN y.o.   at  19 1/7 weeks gestation (Estimated Date of Delivery: 3/4/24).     -Pregnancy  [ X ] stable  [   ] improving [  ] worsening    Diagnoses and all orders for this visit:    1. Family history of congenital heart defect (Primary)       Family history (uncle with hypoplastic L heart)   FOB with single kidney and club feet (neither seen today on baby but discussed limitations of US)  Congenital heart defects may occur due to multifactorial influences, chromosomal abnormalities, genetic syndromes or environmental exposures.  Isolated heart defects are generally multifactorial.  The overall prognosis is dependent on the severity of the heart defect, and whether or not it is due to an underlying chromosome or genetic problem.  Chromosomal and syndromic etiologies may be associated with other birth defects and mental delay.  Risk for recurrence depends on " etiology. With a positive family history in a 2nd degree relative, the risk of this baby having a heart defect is around 1 percent, which is around the baseline risk.  Thus, a fetal ECHO is not indicated. We do not see any evidence of cardiac defect today.     Summary of Plan  -Routine prenatal care    Follow-up: No follow up with MFM scheduled, but I am happy to see for follow up at request of primary obstetrician    Thank you for the consult and opportunity to care for this patient.  Please feel free to reach out with any questions or concerns.      I spent 20 minutes caring for this patient on this date of service. This time includes time spent by me in the following activities: preparing for the visit, reviewing tests, obtaining and/or reviewing a separately obtained history, performing a medically appropriate examination and/or evaluation, counseling and educating the patient/family/caregiver and independently interpreting results and communicating that information with the patient/family/caregiver with greater than 50% spent in counseling and coordination of care.       I spent 5 minutes on the separately reported service of US imaging not included in the time used to support the E/M service also reported today.      Brittany Wray MD FACOG  Maternal Fetal Medicine-[facility]  Office: [phone]  [email or web address]

## 2023-10-10 NOTE — PROGRESS NOTES
Pt reports that she is doing well and denies vaginal bleeding, cramping, contractions or LOF at this time. Reports feeling flutters. Reviewed when to call OB office or present to L&D for evaluation with symptoms such as decreased fetal movement, vaginal bleeding, LOF or ctxs. Pt verbalized understanding. Denies visual changes or epigastric pain. Reports irregular HA over the last few days. Next OB appointment scheduled for 10/17.    Vitals:    10/10/23 1305   BP: 109/52   Pulse: 71   Temp: 99.6 øF (37.6 øC)

## 2023-10-17 ENCOUNTER — ROUTINE PRENATAL (OUTPATIENT)
Dept: OBSTETRICS AND GYNECOLOGY | Facility: CLINIC | Age: 28
End: 2023-10-17
Payer: COMMERCIAL

## 2023-10-17 VITALS — DIASTOLIC BLOOD PRESSURE: 70 MMHG | WEIGHT: 169.2 LBS | BODY MASS INDEX: 29.04 KG/M2 | SYSTOLIC BLOOD PRESSURE: 124 MMHG

## 2023-10-17 DIAGNOSIS — O99.340 DEPRESSION AFFECTING PREGNANCY: Primary | ICD-10-CM

## 2023-10-17 DIAGNOSIS — Z82.79 FAMILY HISTORY OF CONGENITAL HEART DEFECT: ICD-10-CM

## 2023-10-17 DIAGNOSIS — Z34.80 SUPERVISION OF OTHER NORMAL PREGNANCY, ANTEPARTUM: ICD-10-CM

## 2023-10-17 DIAGNOSIS — F32.A DEPRESSION AFFECTING PREGNANCY: Primary | ICD-10-CM

## 2023-10-17 PROCEDURE — 0502F SUBSEQUENT PRENATAL CARE: CPT | Performed by: OBSTETRICS & GYNECOLOGY

## 2023-10-17 NOTE — ASSESSMENT & PLAN NOTE
The patient reports that she is continue to [place] with depression during her pregnancy.  She is having some marital issues and is moving in with her mother.  She reports that her and her  are trying to work through these issues.  She is seeing a counselor.  She is spoken with her counselor about possible medication change or additional medications.  After discussing with the patient options the patient is somewhat concerned about coming off the Zoloft for medication change.  I recommended we go ahead and have her evaluated by psychiatry for further medical therapy, possibly medication change or adjunctive therapy.  Recommend she continue with her Zoloft 10 mg daily for now.  Continue with counseling.

## 2023-10-17 NOTE — PROGRESS NOTES
OB FOLLOW UP    CC: Scheduled OB routine FU     Prenatal care complicated by:   Patient Active Problem List   Diagnosis    Asthma    Family history of congenital heart defect    LGSIL of cervix of undetermined significance    Supervision of other normal pregnancy, antepartum    COVID-19 affecting pregnancy, antepartum    Depression affecting pregnancy       Subjective:   Patient has: No complaints, No leaking fluid, No vaginal bleeding, No contractions, Adequate FM      Objective:  Urine glucose/protein- see flow sheet      /70   Wt 76.7 kg (169 lb 3.2 oz)   LMP 05/29/2023   BMI 29.04 kg/m²   See OB flow for LE edema, and cvx exam if applicable  FHT: 150 BPM   Uterine Size:  20 cm      Assessment and Plan:  Diagnoses and all orders for this visit:    1. Depression affecting pregnancy (Primary)  Assessment & Plan:  The patient reports that she is continue to [place] with depression during her pregnancy.  She is having some marital issues and is moving in with her mother.  She reports that her and her  are trying to work through these issues.  She is seeing a counselor.  She is spoken with her counselor about possible medication change or additional medications.  After discussing with the patient options the patient is somewhat concerned about coming off the Zoloft for medication change.  I recommended we go ahead and have her evaluated by psychiatry for further medical therapy, possibly medication change or adjunctive therapy.  Recommend she continue with her Zoloft 10 mg daily for now.  Continue with counseling.    Orders:  -     Ambulatory Referral to Psychiatry    2. Supervision of other normal pregnancy, antepartum  Overview:  BLAIRE finalized: 3/4/2024 by LMP and 7-week ultrasound    Genetic testing (NIPS-Quad)/CF/AFP:  NIPS negative.  9/19/2023 Considering CF and AFP    COVID: Fully vaccinated.  + Covid Sept 2023.  Flu: Recommended       Anatomy US: VERONICA Wray 10/10/23, nl anatomy, consistent w  dates, no echo needed.  VTX,  Ant placenta.    Assessment & Plan:  Reviewed MFM consult.  Continue prenatal vitamin  1 hour GTT next office visit      3. Family history of congenital heart defect  Overview:  Normal anatomy with MFM.  No echo indicated.  Follow-up with MFM only as needed    Assessment & Plan:  Status post MFM consult.  Normal anatomy and cardiac anatomy.  No fetal echo is indicated.  No follow-up with MFM recommended unless otherwise indicated.            20w1d  Reassuring pregnancy progress    Counseling: Second trimester precautions  OB precautions, leaking, VB, good lugo vs PTL/Labor    Questions answered    Return in about 4 weeks (around 11/14/2023) for Recheck.      Arturo Quach MD  10/17/2023

## 2023-10-17 NOTE — ASSESSMENT & PLAN NOTE
Status post MFM consult.  Normal anatomy and cardiac anatomy.  No fetal echo is indicated.  No follow-up with MFM recommended unless otherwise indicated.

## 2023-10-23 ENCOUNTER — TELEPHONE (OUTPATIENT)
Dept: OBSTETRICS AND GYNECOLOGY | Facility: CLINIC | Age: 28
End: 2023-10-23
Payer: COMMERCIAL

## 2023-10-23 NOTE — TELEPHONE ENCOUNTER
Patient called stating she was in contact with a patient that has tested positive for chicken pox. Grisel states she has been fully vaccinated in the past. She wanted to know if there was anything she needed to do or watch for. Please advise.

## 2023-10-24 ENCOUNTER — TELEPHONE (OUTPATIENT)
Dept: OBSTETRICS AND GYNECOLOGY | Facility: CLINIC | Age: 28
End: 2023-10-24
Payer: COMMERCIAL

## 2023-10-24 NOTE — TELEPHONE ENCOUNTER
Patient called stating she was contacted by Dr. Bowman's office to make her appointment. She can not get in to see him until mid-January. She would be able to see his NP much sooner. Is it okay if she sees his NP instead. Please advise.

## 2023-10-25 ENCOUNTER — TELEPHONE (OUTPATIENT)
Dept: PSYCHIATRY | Facility: CLINIC | Age: 28
End: 2023-10-25

## 2023-10-25 NOTE — TELEPHONE ENCOUNTER
Caller: Grisel Logan    Relationship to patient: Self    Best call back number: [phone]     Chief complaint: DX: O99.340,F32.A ICD-10-CM) - Depression affecting pregnancy     Type of visit: INITIAL EVALUATION    Requested date: 12/6/23    Additional notes: PT REQUESTING MYCHART VIDEO VISIT FOR APPT ON 12/6/23 AT 9AM WITH RAMANA CHAVEZ.

## 2023-11-09 NOTE — PROGRESS NOTES
Routine Prenatal Visit     Subjective  Grisel Logan is a 28 y.o.  at 24w1d here for her routine OB visit.   She is taking her prenatal vitamins.Reports no loss of fluid or vaginal bleeding. Patient doing well without any complaints. Pregnancy is complicated by:     Patient Active Problem List   Diagnosis    Asthma    Family history of congenital heart defect    LGSIL of cervix of undetermined significance    Supervision of other normal pregnancy, antepartum    COVID-19 affecting pregnancy, antepartum    Depression affecting pregnancy         OB History    Para Term  AB Living   3 1 1   1 1   SAB IAB Ectopic Molar Multiple Live Births   1         1      # Outcome Date GA Lbr Kj/2nd Weight Sex Delivery Anes PTL Lv   3 Current            2 SAB 23 6w0d    SAB         Birth Comments: Missed  8 weeks by LMP, 6 weeks by ultrasound, suction D &C   1 Term 19 40w0d  3345 g (7 lb 6 oz) M Vag-Spont  N MYCHAL           ROS:   General ROS: negative for - chills or fatigue  Respiratory ROS: negative for - cough or hemoptysis  Cardiovascular ROS: negative for - chest pain or dyspnea on exertion  Genito-Urinary ROS: negative for  change in urinary stream, vaginal discharge   Musculoskeletal ROS: negative for - gait disturbance or joint pain  Dermatological ROS: negative for acne,  dry skin or itching    Objective  Physical Exam:   Vitals:    23 1301   BP: 108/68       Uterine Size: size equals dates  FHT: 110-160 BPM    General appearance - alert, well appearing, and in no distress  Mental status - alert, oriented to person, place, and time  Abdomen- Soft, Gravid uterus, non-tender to palpation  Musculoskeletal: negative for - gait disturbance or joint pain  Extremeties: negative swelling or cyanosis   Dermatological: negative rashes or skin lesions       Assessment/Plan:   Diagnoses and all orders for this visit:    1. Supervision of other normal pregnancy, antepartum  (Primary)  Assessment & Plan:  BLAIRE finalized: 3/4/2024 by LMP and 7-week ultrasound     Genetic testing (NIPS-Quad)/CF/AFP:  NIPS negative.  9/19/2023 Considering CF and AFP     COVID: Fully vaccinated.  + Covid Sept 2023.  Flu: Recommended   Tdap:     ? Desires Sterilization:     Anatomy US: MFM Dr. Wray 10/10/23, nl anatomy, consistent w dates, no echo needed.  VTX,  Ant placenta.  FU US:     PROBLEM LIST/PLAN:   Anxiety/depression-Zoloft 100 mg.  9/19/2023 doing well  Covid in second tri- baby ASA continue, antepartum testing third tri, growth US third tri     Asthma, childhood-no medications  May 2022 LGSIL, HPV neg- first abn pap. Aug 2023 Pap neg HPV neg. Recommend repeat pap/cotest 3yrs per [facility]  Family history of congenital heart defect/clubbed foot  Nl anatomy ultrasound with MFM, echo not needed       Orders:  -     POC Urinalysis Dipstick  -     CBC (No Diff); Future  -     Gestational Diabetes Screen 1 Hour; Future    2. COVID-19 affecting pregnancy, antepartum    3. Depression affecting pregnancy    4. Family history of congenital heart defect    5. Asthma, unspecified asthma severity, unspecified whether complicated, unspecified whether persistent    6. LGSIL of cervix of undetermined significance          Counseling:   OB precautions, leaking, VB, good lugo vs PTL/Labor  FKC  Round Ligament Pain:  The uterus has several ligaments which provide support and keep the uterus in place. As the  uterus grows these ligaments are pulled and stretched which often causes sharp stabbing like pain in the inguinal area.   You may find a pregnancy support band helpful. Changing positions may also help. Yoga is a great way to cope with round ligament and low back pain in pregnancy.    Massage may also help with low back pain   Things to Consider at this Point in your Pregnancy:  Some women experience swelling in their feet during pregnancy. Compression stockings may help  Drink plenty of water and stay active    Make sure you are eating frequent small meals, nuts are a wonderful snack to keep with you            Return in about 4 weeks (around 12/12/2023) for Routine OB visit.      We have gone over prenatal care to include the timing and content of visits. I informed her how to contact the office and/or on call person in the event of any problems and encouraged her to do so when she feels it is necessary.  We then spent time answering her questions which she indicated were answered to her satisfaction.    Ning Mcfarland DO  11/14/2023 13:19 EST

## 2023-11-14 ENCOUNTER — ROUTINE PRENATAL (OUTPATIENT)
Dept: OBSTETRICS AND GYNECOLOGY | Facility: CLINIC | Age: 28
End: 2023-11-14
Payer: COMMERCIAL

## 2023-11-14 VITALS — SYSTOLIC BLOOD PRESSURE: 108 MMHG | BODY MASS INDEX: 29.76 KG/M2 | DIASTOLIC BLOOD PRESSURE: 68 MMHG | WEIGHT: 173.4 LBS

## 2023-11-14 DIAGNOSIS — Z34.80 SUPERVISION OF OTHER NORMAL PREGNANCY, ANTEPARTUM: Primary | ICD-10-CM

## 2023-11-14 DIAGNOSIS — F32.A DEPRESSION AFFECTING PREGNANCY: ICD-10-CM

## 2023-11-14 DIAGNOSIS — Z82.79 FAMILY HISTORY OF CONGENITAL HEART DEFECT: ICD-10-CM

## 2023-11-14 DIAGNOSIS — O98.519 COVID-19 AFFECTING PREGNANCY, ANTEPARTUM: ICD-10-CM

## 2023-11-14 DIAGNOSIS — R87.612 LGSIL OF CERVIX OF UNDETERMINED SIGNIFICANCE: ICD-10-CM

## 2023-11-14 DIAGNOSIS — O99.340 DEPRESSION AFFECTING PREGNANCY: ICD-10-CM

## 2023-11-14 DIAGNOSIS — J45.909 ASTHMA, UNSPECIFIED ASTHMA SEVERITY, UNSPECIFIED WHETHER COMPLICATED, UNSPECIFIED WHETHER PERSISTENT: ICD-10-CM

## 2023-11-14 DIAGNOSIS — U07.1 COVID-19 AFFECTING PREGNANCY, ANTEPARTUM: ICD-10-CM

## 2023-11-14 LAB
DEPRECATED RDW RBC AUTO: 39.7 FL (ref 37–54)
ERYTHROCYTE [DISTWIDTH] IN BLOOD BY AUTOMATED COUNT: 12.5 % (ref 12.3–15.4)
GLUCOSE 1H P GLC SERPL-MCNC: 81 MG/DL (ref 65–139)
GLUCOSE UR STRIP-MCNC: NEGATIVE MG/DL
HCT VFR BLD AUTO: 36.2 % (ref 34–46.6)
HGB BLD-MCNC: 12.2 G/DL (ref 12–15.9)
MCH RBC QN AUTO: 29.7 PG (ref 26.6–33)
MCHC RBC AUTO-ENTMCNC: 33.7 G/DL (ref 31.5–35.7)
MCV RBC AUTO: 88.1 FL (ref 79–97)
PLATELET # BLD AUTO: 313 10*3/MM3 (ref 140–450)
PMV BLD AUTO: 9.3 FL (ref 6–12)
PROT UR STRIP-MCNC: NEGATIVE MG/DL
RBC # BLD AUTO: 4.11 10*6/MM3 (ref 3.77–5.28)
WBC NRBC COR # BLD: 8.11 10*3/MM3 (ref 3.4–10.8)

## 2023-11-14 PROCEDURE — 85027 COMPLETE CBC AUTOMATED: CPT | Performed by: STUDENT IN AN ORGANIZED HEALTH CARE EDUCATION/TRAINING PROGRAM

## 2023-11-14 PROCEDURE — 82950 GLUCOSE TEST: CPT | Performed by: STUDENT IN AN ORGANIZED HEALTH CARE EDUCATION/TRAINING PROGRAM

## 2023-11-14 NOTE — ASSESSMENT & PLAN NOTE
BLAIRE finalized: 3/4/2024 by LMP and 7-week ultrasound     Genetic testing (NIPS-Quad)/CF/AFP:  NIPS negative.  9/19/2023 Considering CF and AFP     COVID: Fully vaccinated.  + Covid Sept 2023.  Flu: Recommended   Tdap:     ? Desires Sterilization:     Anatomy US: MFM Dr. Wray 10/10/23, nl anatomy, consistent w dates, no echo needed.  VTX,  Ant placenta.  FU US:     PROBLEM LIST/PLAN:   Anxiety/depression-Zoloft 100 mg.  9/19/2023 doing well  Covid in second tri- baby ASA continue, antepartum testing third tri, growth US third tri     Asthma, childhood-no medications  May 2022 LGSIL, HPV neg- first abn pap. Aug 2023 Pap neg HPV neg. Recommend repeat pap/cotest 3yrs per [facility]  Family history of congenital heart defect/clubbed foot  Nl anatomy ultrasound with MFM, echo not needed

## 2023-11-14 NOTE — PATIENT INSTRUCTIONS
Venipuncture Blood Specimen Collection  Venipuncture performed in left arm by Gale Melchor with good hemostasis. Patient tolerated the procedure well without complications.   11/14/23   Gale Melchor

## 2023-12-06 ENCOUNTER — TELEMEDICINE (OUTPATIENT)
Dept: PSYCHIATRY | Facility: CLINIC | Age: 28
End: 2023-12-06
Payer: COMMERCIAL

## 2023-12-06 DIAGNOSIS — Z63.0 STRESS DUE TO MARITAL PROBLEMS: ICD-10-CM

## 2023-12-06 DIAGNOSIS — F41.1 GENERALIZED ANXIETY DISORDER: ICD-10-CM

## 2023-12-06 DIAGNOSIS — O99.343 PERINATAL DEPRESSION IN THIRD TRIMESTER: ICD-10-CM

## 2023-12-06 DIAGNOSIS — F32.A PERINATAL DEPRESSION IN THIRD TRIMESTER: ICD-10-CM

## 2023-12-06 SDOH — SOCIAL STABILITY - SOCIAL INSECURITY: PROBLEMS IN RELATIONSHIP WITH SPOUSE OR PARTNER: Z63.0

## 2023-12-06 NOTE — PATIENT INSTRUCTIONS
"1. Should you want to get in touch with your provider, KENYA Day, please contact MY Medical Assistant, Kristi, directly at [phone].  Recommend saving Kristi's direct number in phone as this is the PREFERRED & EASIEST way to get in contact with your provider.  Please leave a voice mail if you do not get an answer and she will return your call within 24 hrs. You will NOT be able to contact provider on [employer], as Behavioral Health Providers are restricted. YOU MUST CALL [phone]  If you need to speak with the on call provider after hours or on weekends, please Contact the [facility] ([phone]) and staff will be able to page the provider on call directly.     2.  In the event you need to cancel an appointment, please notify the office at least 24 hrs prior:   Contact **Kristi Medical Assistant at [facility] directly at [phone] or the [facility] ([phone])     3. MEDICATION REFILLS:  PLEASE CALL THE PHARMACY TO REQUEST ALL MEDICATION REFILLS or via [employer] TO ENSURE YOU ARE RECEIVING YOUR MEDICATIONS IN A TIMELY MANNER. The pharmacy or [employer] will send this request ELECTRONICALLY to the ordering provider.   IF YOU USE AN AUTOMATED SERVICE AT THE PHARMACY FOR REFILLS AND ARE TOLD THERE ARE \"NO REFILLS REMAINING\"   PLEASE CALL THE PHARMACY & SPEAK TO A LIVE PERSON TO VERIFY IT IS THE MOST UP TO DATE PRESCRIPTION ON FILE.    All new prescriptions will have a different number, therefore, if you were given refills for a medication today or at last visit it will not have the same number as the previous prescription.     4.  In the event you have personal crisis, contact the following crisis numbers: Suicide Prevention Hotline 1-995.203.9942 or *988, TOM Helpline 4-790-864-HWAZ; [facility] Emergency Room [phone]; text HELLO to 771200; or 911.  If you feel like harming yourself or others, call 911 right away.  You can call the 983 Suicide and Crisis " "Lifeline at  988   to speak with a counselor at the [employer], or you can connect with one using their online chat  .    5.  Never stop an antidepressant medicine without first talking to a healthcare provider. Suddenly stopping this type of medication can cause withdrawal symptoms.    6.  Counseling and talk therapy  Counseling or therapy teaches you new coping skills and more adaptive ways of thinking about problems. These tools can help you make positive changes. The benefits of counseling often last long after treatment sessions have stopped.    7.   We would appreciate your feedback, please scan the QRS code on the back of your appointment card (or see below) and complete a brief survey.  [place] location is still not available, so please click \"[place]\" location.  Thank you      SPECIFIC RECOMMENDATIONS:     1.      Medications discussed at this encounter:                   -  continue Zoloft 100 mg daily, will call you later to discuss potentially increasing to 125 mg or splitting doses after discussing with Dr. Bowman.    2.      Psychotherapy recommendations: Continue with current therapist.      3.     Return to clinic: 6 weeks,Tues. 1/16/24 at 220 pm via video    Coping mechanisms discussed with patient today as a way to gain control over feelings to prevent situation or panic attack from getting worse: grounding techniques using 5 senses (name 3 things you can see, smell, touch, etc.), slow paced breathing techniques- focus on door inhaling and exhaling at each corner, application of cold compress/ice pack/water on face or opening freezer door to allow cold air to be breathed in taking slow deep breaths, closing eyes and focus on positive thoughts.     Please arrive at least 15 minutes before your scheduled appointment time to complete check in process.      IF you are scheduled for a [employer] VIDEO visit, YOU MUST COMPLETE THE \"E-CHECK IN\" PROCESS PRIOR TO BEGINNING THE VISIT, YOU WILL NO LONGER " RECEIVE A PHONE CALL PRIOR TO ALL VIDEO VISITS; You may still complete the E-Check in for in office visits prior to appointment, you will receive multiple text/email reminders which will direct you further if needed.

## 2023-12-06 NOTE — PROGRESS NOTES
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 amniocentesis.  The ability to report results may be impacted by maternal  BMI, maternal weight, maternal systemic lupus erythematosus  (SLE) and/or by certain pharmaceutical agents such as low  molecular weight heparin (for example: Lovenox(R),  Xaparin(R), Clexane(R) and Fragmin(R)).    Note 08/21/2023 Comment   Final    See Notes  [employer] is a subsidiary of [employer], using the brand [employer]. This test was  developed and its performance characteristics determined by  [employer]. It has not been cleared or approved by the Food  and Drug Administration. This laboratory is certified under  the Clinical Laboratory Improvement Amendments (CLIA) as  qualified to perform high complexity clinical laboratory  testing and accredited by the College of American  Pathologists (CAP).  If there is future clinical need for adding [facility] [facility]  testing, this specimen will be available until term.  [place] samples will not be retained beyond 60 days.  [facility] patients will have to send a new sample for  re-sequencing ([facility] Test Code: [id number]).    References 08/21/2023 Comment    Final    1. Shanna BUTTERFIELD et al. Kaci Med. 2012;14(3):296-305.  2. Ping CURIEL, et al. Prenat Diag. 2013;33(6):591-597.  3. Rodger MILLAN, et al. Clin Chem. 2015 Apr;61(4):608-616.  4. Shanna BUTTERFIELD, et al. Kaci Med. 2011;13(11):913-920.  5. ACOG/SMFM Practice Bulletin No. 226, Oct 2020.    WBC 08/21/2023 8.26  3.40 - 10.80 10*3/mm3 Final    RBC 08/21/2023 4.62  3.77 - 5.28 10*6/mm3 Final    Hemoglobin 08/21/2023 13.4  12.0 - 15.9 g/dL Final    Hematocrit 08/21/2023 40.6  34.0 - 46.6 % Final    MCV 08/21/2023 87.9  79.0 - 97.0 fL Final    MCH 08/21/2023 29.0  26.6 - 33.0 pg Final    MCHC 08/21/2023 33.0  31.5 - 35.7 g/dL Final    RDW 08/21/2023 12.6  12.3 - 15.4 % Final    RDW-SD 08/21/2023 40.1  37.0 - 54.0 fl Final    MPV 08/21/2023 9.5  6.0 - 12.0 fL Final    Platelets 08/21/2023 311  140 - 450 10*3/mm3 Final    Neutrophil % 08/21/2023 77.4 (H)  42.7 - 76.0 % Final    Lymphocyte % 08/21/2023 16.9 (L)  19.6 - 45.3 % Final    Monocyte % 08/21/2023 4.4 (L)  5.0 - 12.0 % Final    Eosinophil % 08/21/2023 0.7  0.3 - 6.2 % Final    Basophil % 08/21/2023 0.2  0.0 - 1.5 % Final    Immature Grans % 08/21/2023 0.4  0.0 - 0.5 % Final    Neutrophils, Absolute 08/21/2023 6.39  1.70 - 7.00 10*3/mm3 Final    Lymphocytes, Absolute 08/21/2023 1.40  0.70 - 3.10 10*3/mm3 Final    Monocytes, Absolute 08/21/2023 0.36  0.10 - 0.90 10*3/mm3 Final    Eosinophils, Absolute 08/21/2023 0.06  0.00 - 0.40 10*3/mm3 Final    Basophils, Absolute 08/21/2023 0.02  0.00 - 0.20 10*3/mm3 Final    Immature Grans, Absolute 08/21/2023 0.03  0.00 - 0.05 10*3/mm3 Final    nRBC 08/21/2023 0.0  0.0 - 0.2 /100 WBC Final    Hepatitis B Surface Ag 08/21/2023 Non-Reactive  Non-Reactive Final    Rubella Antibodies, IgG 08/21/2023 1.50  Immune >0.99 index Final                                    Non-immune       <0.90                                  Equivocal  0.90 - 0.99                                  Immune           >0.99    ABO Type 08/21/2023 A   Final    RH type  2023 Positive   Final    Antibody Screen 2023 Negative   Final    HIV-1/ HIV-2 2023 Non-Reactive  Non-Reactive Final    Hepatitis C Ab 2023 Non-Reactive  Non-Reactive Final    Treponemal AB IgG 2023 Non-Reactive  Non-Reactive Final   Lab on 2023   Component Date Value Ref Range Status    HCG Quantitative 2023 1,498.00  mIU/mL Final   Lab on 2023   Component Date Value Ref Range Status    HCG Quantitative 2023 728.30  mIU/mL Final       EKG Results:  No orders to display       Imaging Results:  No Images in the past 120 days found..      Assessment & Plan   Diagnoses and all orders for this visit:    1.  depression in third trimester    2. Generalized anxiety disorder    3. Stress due to marital problems        Visit Diagnoses:    ICD-10-CM ICD-9-CM   1.  depression in third trimester  O99.343 648.43    F32.A 311   2. Generalized anxiety disorder  F41.1 300.02   3. Stress due to marital problems  Z63.0 V61.10       PLAN:  Safety: No acute safety concerns  Therapy: Currently Seeing a Therapist Fela Pride with [facility]  Patient educated and encouraged to continue therapy to develop new coping skills and more adaptive ways of thinking about problems. These tools can help make positive changes. The benefits of counseling often last long after treatment sessions have stopped.  Risk Assessment: Risk of self-harm acutely is moderate.  Risk factors include anxiety disorder and mood disorder, current pregnancy with marital problems.  Protective factors include no family history, denies access to guns/weapons, no present SI, no history of suicide attempts or self-harm in the past, minimal AODA, healthcare seeking, future orientation, willingness to engage in care.  Risk of self-harm chronically is also moderate, but could be further elevated in the event of treatment noncompliance and/or AODA.  Meds:  Continue Zoloft 100 mg by mouth daily in  the morning to target depression and anxiety.  Discussed all risks, benefits, alternatives, and side effects of Zoloft (Sertraline) including but not limited to GI upset, sexual dysfunction, bleeding risk, theoretical decrease of seizure threshold predisposing the patient to a slightly higher seizure risk, insomnia, sedation, dizziness, fatigue, drowsiness, activation of adri or hypomania, increased fragility fracture risk, hyponatremia, ocular effects, withdrawal syndrome following abrupt discontinuation, serotonin syndrome, and increased suicidality in patients 24 years and younger.  Patient educated on the need to practice safe sex while taking this medication. Discussed the need for patient to immediately call the office for any new or worsening symptoms, such as worsening depression; feeling nervous or restless; suicidal thoughts or actions; or other changes in mood or behavior, and all other concerns. Patient  educated on medication compliance and the risks of suddenly stopping this medication or missing doses. Patient verbalized understanding and is agreeable to taking Sertraline. Addressed all questions and concerns. Patient informed Zoloft is safe in breastfeeding as it is present in the breastmilk at a concentration far less than 10% (the threshold).      Labs: n/a  Patient informed that going forward refills of current psychotropic medications previously prescribed by PCP will now be managed by this provider, and to contact provider MA INITIALLY when down to 5-7 days remaining to ensure new refill(s) will have this provider name on prescription for future refills. Explained to patient if requested from current bottle, via mychart, or via pharmacy the request would be directed to ordering provider. And to prevent confusion, inaccurate dosing, inaccurate medication refills, the management of psychotropic and/or mental health medications should only be ordered by this mental health provider. Patient  verbalized understanding and agreed to proceed.    7.   Coping mechanisms discussed with patient today as a way to gain control over feelings to prevent situation or panic attack from getting worse: grounding techniques using 5 senses (name 3 things you can see, smell, touch, etc.), slow paced breathing techniques- focus on door inhaling and exhaling at each corner, application of cold compress/ice pack/water on face or opening freezer door to allow cold air to be breathed in taking slow deep breaths, closing eyes and focus on positive thoughts.   8.  Will discuss POC with  and contact patient later this evening regarding potentially increasing Zoloft to 125 mg daily due to intolerance with 150 mg in the past, vs splitting doses as patient inquired. Patient denies need for refill today.     Patient presentation seems most consistent with anxiety and  depression due to marital stress.   Patient is at a higher risk of post partum depression due to current marital stressors and history of post partum depression with first child, which was managed well with Zoloft.  Switching to an alternative antidepressant during 3rd trimester of pregnancy would not be advised, however, will discuss further with Dr. Bowman. Patient is agreeable to plan.  Patient was given direct contact number to MA in [facility] number for any needs after 430 pm as an on call provider is available.   Patient was given instructions and counseling regarding condition and for health maintenance advice. Please see specific information pulled into the AVS if appropriate.   Patient to contact provider if symptoms worsen or fail to improve.      Patient screened positive for depression based on a PHQ-9 score of 11 on 2023. Follow-up recommendations include: Suicide Risk Assessment performed and continue with current antidepressant .         TREATMENT PLAN/GOALS: Continue supportive psychotherapy efforts and medications  as indicated. Treatment and medication options discussed during today's visit. Patient ackowledged and verbally consented to continue with current treatment plan and was educated on the importance of compliance with treatment and follow-up appointments.    MEDICATION ISSUES:  NATALIE reviewed as expected.  Discussed medication options and treatment plan of prescribed medication as well as the risks, benefits, and side effects including potential falls, possible impaired driving and metabolic adversities among others. Patient is agreeable to call the office with any worsening of symptoms or onset of side effects. Patient is agreeable to call 911 or go to the nearest ER should he/she begin having SI/HI. No medication side effects or related complaints today.     MEDS ORDERED DURING VISIT:  No orders of the defined types were placed in this encounter.      Return in about 6 weeks (around 1/17/2024) for Video visit, medication check.         I spent 78 minutes caring for Grisel on this date of service. This time includes time spent by me in the following activities: preparing for the visit, reviewing tests, obtaining and/or reviewing a separately obtained history, performing a medically appropriate examination and/or evaluation, counseling and educating the patient/family/caregiver, ordering medications, tests, or procedures, referring and communicating with other health care professionals, documenting information in the medical record, care coordination, and scheduling .      This document has been electronically signed by KENYA Day  December 6, 2023 16:29 EST           Part of this note may be an electronic transcription/translation of spoken language to printed text using the Dragon Dictation System.

## 2023-12-07 ENCOUNTER — TELEPHONE (OUTPATIENT)
Dept: BEHAVIORAL HEALTH | Facility: CLINIC | Age: 28
End: 2023-12-07
Payer: COMMERCIAL

## 2023-12-07 DIAGNOSIS — O99.343 PERINATAL DEPRESSION IN THIRD TRIMESTER: Primary | ICD-10-CM

## 2023-12-07 DIAGNOSIS — F32.A PERINATAL DEPRESSION IN THIRD TRIMESTER: Primary | ICD-10-CM

## 2023-12-07 DIAGNOSIS — F41.1 GENERALIZED ANXIETY DISORDER: ICD-10-CM

## 2023-12-07 RX ORDER — ESCITALOPRAM OXALATE 10 MG/1
10 TABLET ORAL NIGHTLY
Qty: 30 TABLET | Refills: 0 | Status: SHIPPED | OUTPATIENT
Start: 2023-12-07 | End: 2024-01-06

## 2023-12-07 NOTE — TELEPHONE ENCOUNTER
Called patient back and informed patient after speaking with , if the Zoloft is not working as effectively it is reasonable to switch to Lexapro as discussed previously or could also split the dose of Zoloft.     Patient instructed to Switch from Zoloft 100 mg by mouth nightly TO Escitalopram (LEXAPRO) 10 mg by mouth nightly to target depression and anxiety.  Risks, benefits, alternatives discussed with patient including GI upset, nausea, vomiting, diarrhea, theoretical decrease of seizure threshold predisposing the patient to a slightly higher seizure risk, headaches, sexual dysfunction, serotonin syndrome, sweating, and bleeding risk.    Risks, benefits, and potential side effects of SSRI's in pregnancy, including low risk of septal heart defects, drug discontinued syndrome of neonates-respiratory distress, cyanosis, seizure, respiratory distress, feeding difficulty, vomiting, hypoglycemia, tremors, irritability, jitteriness, and constant crying- these problems were discussed in detail with the patient.  After discussion of these risks, as well as risk of untreated psychiatric illness, patient and examiner have mutually agreed to go forward with use of this medication during pregnancy.     Patient instructed to proceed with taking Zoloft tonight if Lexapro is unavailable from the pharmacy and to not take both medications on the same day.   Will schedule patient for an earlier visit on Wed 12/27/23 at 1140 via video, patient has lunch at 1230, though feels she will be able to adjust schedule and informed patient if unable to do so at scheduled time would wait for her to arrive by 1215 pm. Patient verbalized understanding. Patient instructed to contact provider directly if any thoughts of suicide occurred, negative intrusive thoughts, or intolerance to notify provider immediately.     Will update  of changes to POC.

## 2023-12-07 NOTE — TELEPHONE ENCOUNTER
Called patient to discuss medication options after discussion with  yesterday, however, patient was driving on a road with poor service, patient has lunch from 1-2 pm today, and will call patient back around 115 pm today to discuss.

## 2023-12-11 NOTE — PROGRESS NOTES
OB FOLLOW UP      Chief Complaint   Patient presents with    Routine Prenatal Visit     Wants to talk about meds      Subjective:   Anxiety has a significant amount of stress with , possibly going through divorce is seeing a therapist who recommended Lexapro, has not started it yet.  Denies SI/HI    Objective:  /70   Wt 80.3 kg (177 lb)   LMP 05/29/2023   BMI 30.38 kg/m²  4.536 kg (10 lb) Facility age limit for growth %celso is 20 years.  See OB flow sheet for fundal height (not performed if US day of OV), FHT, edema, cvx exam if performed, and Upro/Uglu      Assessment and Plan:  28w1d     Diagnoses and all orders for this visit:    1. Supervision of other normal pregnancy, antepartum (Primary)  Overview:  BLAIRE finalized: 3/4/2024 by LMP and 7-week ultrasound    NIPS negative.  Declines CF 12/12/2023     COVID: Fully vaccinated.  + Covid Sept 2023.  Flu: Vaccinated  Tdap: Recommended, s/p Rx 12/12/2023     ? Desires Sterilization:  Declines 12/12/2023     Anatomy US: MFM Dr. Wray 10/10/23, nl anatomy, consistent w dates, no echo needed.  VTX,  Ant placenta.  FU US: 12/12/2023 ordered    PROBLEM LIST/PLAN:   Anxiety/depression-Zoloft 100 mg.  Therapist started November, prescribed Lexapro, hasn't started   12/12/2023 anxiety, stress, possible divorce.  No SI or HI.  Recommend con't w therapist and meds recommended.  SI/HI precautions, pt denies any plans or recent thoughts  Covid in second tri- baby ASA continue, antepartum testing third tri, growth US third tri    Asthma, childhood-no medications.  Doing well  May 2022 LGSIL, HPV neg- first abn pap. Aug 2023 Pap neg HPV neg. Recommend repeat pap/cotest 3yrs per [facility]  Family history of congenital heart defect/clubbed foot  Nl anatomy ultrasound with MFM, echo not needed    Orders:  -     POC Urinalysis Dipstick  -     US Ob 14 + Weeks Single or First Gestation; Future      Counseling:    OB precautions, leaking, VB, good lugo vs  PTL/Labor  FKC  ANXIETY/DEPRESSION- TATIANA-  adaptation syndrome reviewed.  Weaning in third trimester if possible, newer evidence doesn't suggest the need. Some studies indicate child w increased risk Dep/Anx w SSRI use in preg.      Reassuring pregnancy progress. Questions answered  Continue PNV.  Importance of healthy eating, obtaining sufficient sleep, and staying active unless hypertensive- activity modified as directed.    Return in about 2 weeks (around 2023) for FU OB q2wks to 36weeks.            Nidia Pollard,   2023    [facility]  [address] [place] [address]  Dept: [phone]  Dept Fax: [phone]  Loc: [phone]  Loc Fax: [phone]

## 2023-12-12 ENCOUNTER — ROUTINE PRENATAL (OUTPATIENT)
Dept: OBSTETRICS AND GYNECOLOGY | Facility: CLINIC | Age: 28
End: 2023-12-12
Payer: COMMERCIAL

## 2023-12-12 VITALS — SYSTOLIC BLOOD PRESSURE: 112 MMHG | DIASTOLIC BLOOD PRESSURE: 70 MMHG | BODY MASS INDEX: 30.38 KG/M2 | WEIGHT: 177 LBS

## 2023-12-12 DIAGNOSIS — Z34.80 SUPERVISION OF OTHER NORMAL PREGNANCY, ANTEPARTUM: Primary | ICD-10-CM

## 2023-12-12 LAB
GLUCOSE UR STRIP-MCNC: NEGATIVE MG/DL
PROT UR STRIP-MCNC: ABNORMAL MG/DL

## 2023-12-12 PROCEDURE — 0502F SUBSEQUENT PRENATAL CARE: CPT | Performed by: OBSTETRICS & GYNECOLOGY

## 2023-12-26 ENCOUNTER — ROUTINE PRENATAL (OUTPATIENT)
Dept: OBSTETRICS AND GYNECOLOGY | Facility: CLINIC | Age: 28
End: 2023-12-26
Payer: COMMERCIAL

## 2023-12-26 VITALS — WEIGHT: 181 LBS | DIASTOLIC BLOOD PRESSURE: 73 MMHG | SYSTOLIC BLOOD PRESSURE: 121 MMHG | BODY MASS INDEX: 31.07 KG/M2

## 2023-12-26 DIAGNOSIS — F32.A DEPRESSION AFFECTING PREGNANCY: ICD-10-CM

## 2023-12-26 DIAGNOSIS — O98.519 COVID-19 AFFECTING PREGNANCY, ANTEPARTUM: ICD-10-CM

## 2023-12-26 DIAGNOSIS — J45.909 ASTHMA, UNSPECIFIED ASTHMA SEVERITY, UNSPECIFIED WHETHER COMPLICATED, UNSPECIFIED WHETHER PERSISTENT: ICD-10-CM

## 2023-12-26 DIAGNOSIS — O99.340 DEPRESSION AFFECTING PREGNANCY: ICD-10-CM

## 2023-12-26 DIAGNOSIS — Z34.80 SUPERVISION OF OTHER NORMAL PREGNANCY, ANTEPARTUM: Primary | ICD-10-CM

## 2023-12-26 DIAGNOSIS — U07.1 COVID-19 AFFECTING PREGNANCY, ANTEPARTUM: ICD-10-CM

## 2023-12-26 LAB
GLUCOSE UR STRIP-MCNC: NEGATIVE MG/DL
PROT UR STRIP-MCNC: NEGATIVE MG/DL

## 2023-12-26 NOTE — PROGRESS NOTES
Routine Prenatal Visit     Subjective  Grisel Logan is a 28 y.o.  at 30w1d here for her routine OB visit.   She is taking her prenatal vitamins.Reports no loss of fluid or vaginal bleeding. Patient doing well without any complaints. Pregnancy complicated by:     Patient Active Problem List   Diagnosis    Asthma    Family history of congenital heart defect    LGSIL of cervix of undetermined significance    Supervision of other normal pregnancy, antepartum    COVID-19 affecting pregnancy, antepartum    Depression affecting pregnancy         OB History    Para Term  AB Living   3 1 1   1 1   SAB IAB Ectopic Molar Multiple Live Births   1         1      # Outcome Date GA Lbr Kj/2nd Weight Sex Delivery Anes PTL Lv   3 Current            2 SAB 23 6w0d    SAB         Birth Comments: Missed  8 weeks by LMP, 6 weeks by ultrasound, suction D &C   1 Term 19 40w0d  3345 g (7 lb 6 oz) M Vag-Spont  N MYCHAL           ROS:   General ROS: negative for - chills or fatigue  Respiratory ROS: negative for - cough or hemoptysis  Cardiovascular ROS: negative for - chest pain or dyspnea on exertion  Genito-Urinary ROS: negative for  change in urinary stream, vaginal discharge   Musculoskeletal ROS: negative for - gait disturbance or joint pain  Dermatological ROS: negative for acne,  dry skin or itching    Objective  Physical Exam:   Vitals:    23 0912   BP: 121/73       Uterine Size: size equals dates  FHT: 110-160 BPM    General appearance - alert, well appearing, and in no distress  Mental status - alert, oriented to person, place, and time  Abdomen- Soft, Gravid uterus, non-tender to palpation  Musculoskeletal: negative for - gait disturbance or joint pain  Extremeties: negative swelling or cyanosis   Dermatological: negative rashes or skin lesions       Assessment/Plan:   Diagnoses and all orders for this visit:    1. Supervision of other normal pregnancy, antepartum  (Primary)  Assessment & Plan:  BLAIRE finalized: 3/4/2024 by LMP and 7-week ultrasound     Genetic testing (NIPS-Quad)/CF/AFP:  NIPS negative.  9/19/2023 Considering CF and AFP     COVID: Fully vaccinated.  + Covid Sept 2023.  Flu: Recommended   Tdap:     ? Desires Sterilization:     Anatomy US: MFM Dr. Wray 10/10/23, nl anatomy, consistent w dates, no echo needed.  VTX,  Ant placenta.  FU US:     PROBLEM LIST/PLAN:   Anxiety/depression-Zoloft 100 mg.  9/19/2023 doing well  Covid in second tri- baby ASA continue, antepartum testing third tri, growth US third tri     Asthma, childhood-no medications  May 2022 LGSIL, HPV neg- first abn pap. Aug 2023 Pap neg HPV neg. Recommend repeat pap/cotest 3yrs per [facility]  Family history of congenital heart defect/clubbed foot  Nl anatomy ultrasound with MFM, echo not needed       Orders:  -     POC Urinalysis Dipstick    2. COVID-19 affecting pregnancy, antepartum    3. Depression affecting pregnancy    4. Asthma, unspecified asthma severity, unspecified whether complicated, unspecified whether persistent            Counseling:   OB precautions, leaking, VB, good lugo vs PTL/Labor  FKC  Round Ligament Pain:  The uterus has several ligaments which provide support and keep the uterus in place. As the  uterus grows these ligaments are pulled and stretched which often causes sharp stabbing like pain in the inguinal area.   You may find a pregnancy support band helpful. Changing positions may also help. Yoga is a great way to cope with round ligament and low back pain in pregnancy.    Massage may also help with low back pain   Things to Consider at this Point in your Pregnancy:  Some women experience swelling in their feet during pregnancy. Compression stockings may help  Drink plenty of water and stay active   Make sure you are eating frequent small meals, nuts are a wonderful snack to keep with you            Return in about 2 weeks (around 1/9/2024) for Routine OB visit.      We  have gone over prenatal care to include the timing and content of visits. I informed her how to contact the office and/or on call person in the event of any problems and encouraged her to do so when she feels it is necessary.  We then spent time answering her questions which she indicated were answered to her satisfaction.    Ning Mcfarland DO  12/26/2023 09:40 EST

## 2023-12-27 ENCOUNTER — TELEMEDICINE (OUTPATIENT)
Dept: PSYCHIATRY | Facility: CLINIC | Age: 28
End: 2023-12-27
Payer: COMMERCIAL

## 2023-12-27 DIAGNOSIS — F41.1 GENERALIZED ANXIETY DISORDER: ICD-10-CM

## 2023-12-27 DIAGNOSIS — O99.343 PERINATAL DEPRESSION IN THIRD TRIMESTER: ICD-10-CM

## 2023-12-27 DIAGNOSIS — F32.A PERINATAL DEPRESSION IN THIRD TRIMESTER: ICD-10-CM

## 2023-12-27 RX ORDER — CETIRIZINE HYDROCHLORIDE 10 MG/1
TABLET ORAL
COMMUNITY
Start: 2023-10-17

## 2023-12-27 RX ORDER — ESCITALOPRAM OXALATE 10 MG/1
10 TABLET ORAL EVERY MORNING
Qty: 7 TABLET | Refills: 0 | Status: SHIPPED | OUTPATIENT
Start: 2024-01-10 | End: 2024-01-17

## 2023-12-27 RX ORDER — FAMOTIDINE 10 MG
TABLET ORAL
COMMUNITY
Start: 2023-06-01

## 2023-12-27 NOTE — PROGRESS NOTES
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 these tests. An uninformative result may be  reported, the causes of which may include, but are not  limited to, insufficient sequencing coverage, noise or  artifacts in the region, amplification or sequencing bias,  or insufficient fetal fraction. These tests are not  intended to identify pregnancies at risk for neural tube  defects or ventral wall defects. Testing for whole  chromosome abnormalities (including sex chromosomes) and  for subchromosomal abnormalities could lead to the  potential discovery of both fetal and maternal genomic  abnormalities that could have major, minor, or no, clinical  significance. Evaluating the significance of a positive or  a non-reportable result may involve both invasive testing  and additional studies on the mother. Such investigations  may lead to a                            diagnosis of maternal chromosomal or  subchromosomal abnormalities, which on occasion may be  associated with benign or malignant maternal neoplasms.  These tests may not accurately identify fetal triploidy,  balanced rearrangements, or the precise location of  subchromosomal duplications or  deletions; these may be  detected by prenatal diagnosis with CVS or amniocentesis.  The ability to report results may be impacted by maternal  BMI, maternal weight, maternal systemic lupus erythematosus  (SLE) and/or by certain pharmaceutical agents such as low  molecular weight heparin (for example: Lovenox(R),  Xaparin(R), Clexane(R) and Fragmin(R)).    Note 08/21/2023 Comment   Final    See Notes  [employer] is a subsidiary of [employer], using the brand [employer]. This test was  developed and its performance characteristics determined by  [employer]. It has not been cleared or approved by the Food  and Drug Administration. This laboratory is certified under  the Clinical Laboratory Improvement Amendments (CLIA) as  qualified to perform high complexity clinical laboratory  testing and accredited by the College of American  Pathologists (CAP).  If there is future clinical need for adding [employer]  testing, this specimen will be available until term.  [place] samples will not be retained beyond 60 days.  [facility] patients will have to send a new sample for  re-sequencing ([facility] Test Code: 121730).    References 08/21/2023 Comment   Final    1. Shanna BUTTERFIELD, et al. Kaci Med. 2012;14(3):296-305.  2. Ping CURIEL, et al. Prenat Diag. 2013;33(6):591-597.  3. Rodger C, et al. Clin Chem. 2015 Apr;61(4):608-616.  4. Shanna BUTTERFIELD, et al. Kaci Med. 2011;13(11):913-920.  5. ACOG/SMFM Practice Bulletin No. 226, Oct 2020.    WBC 08/21/2023 8.26  3.40 - 10.80 10*3/mm3 Final    RBC 08/21/2023 4.62  3.77 - 5.28 10*6/mm3 Final    Hemoglobin 08/21/2023 13.4  12.0 - 15.9 g/dL Final    Hematocrit 08/21/2023 40.6  34.0 - 46.6 % Final    MCV 08/21/2023 87.9  79.0 - 97.0 fL Final    MCH 08/21/2023 29.0  26.6 - 33.0 pg Final    MCHC 08/21/2023 33.0  31.5 - 35.7 g/dL Final    RDW 08/21/2023 12.6  12.3 - 15.4 % Final    RDW-SD 08/21/2023 40.1  37.0 - 54.0 fl Final    MPV 08/21/2023 9.5  6.0 -  12.0 fL Final    Platelets 2023 311  140 - 450 10*3/mm3 Final    Neutrophil % 2023 77.4 (H)  42.7 - 76.0 % Final    Lymphocyte % 2023 16.9 (L)  19.6 - 45.3 % Final    Monocyte % 2023 4.4 (L)  5.0 - 12.0 % Final    Eosinophil % 2023 0.7  0.3 - 6.2 % Final    Basophil % 2023 0.2  0.0 - 1.5 % Final    Immature Grans % 2023 0.4  0.0 - 0.5 % Final    Neutrophils, Absolute 2023 6.39  1.70 - 7.00 10*3/mm3 Final    Lymphocytes, Absolute 2023 1.40  0.70 - 3.10 10*3/mm3 Final    Monocytes, Absolute 2023 0.36  0.10 - 0.90 10*3/mm3 Final    Eosinophils, Absolute 2023 0.06  0.00 - 0.40 10*3/mm3 Final    Basophils, Absolute 2023 0.02  0.00 - 0.20 10*3/mm3 Final    Immature Grans, Absolute 2023 0.03  0.00 - 0.05 10*3/mm3 Final    nRBC 2023 0.0  0.0 - 0.2 /100 WBC Final    Hepatitis B Surface Ag 2023 Non-Reactive  Non-Reactive Final    Rubella Antibodies, IgG 2023 1.50  Immune >0.99 index Final                                    Non-immune       <0.90                                  Equivocal  0.90 - 0.99                                  Immune           >0.99    ABO Type 2023 A   Final    RH type 2023 Positive   Final    Antibody Screen 2023 Negative   Final    HIV-1/ HIV-2 2023 Non-Reactive  Non-Reactive Final    Hepatitis C Ab 2023 Non-Reactive  Non-Reactive Final    Treponemal AB IgG 2023 Non-Reactive  Non-Reactive Final   Lab on 2023   Component Date Value Ref Range Status    HCG Quantitative 2023 1,498.00  mIU/mL Final   Lab on 2023   Component Date Value Ref Range Status    HCG Quantitative 2023 728.30  mIU/mL Final       EKG Results:  No orders to display       Imaging Results:  No Images in the past 120 days found..      Assessment & Plan   Diagnoses and all orders for this visit:    1.  depression in third trimester  -     escitalopram (Lexapro) 10 MG tablet;  Take 1 tablet by mouth Every Morning for 7 days. Indications: Generalized Anxiety Disorder, Major Depressive Disorder,  depression  Dispense: 7 tablet; Refill: 0    2. Generalized anxiety disorder  -     escitalopram (Lexapro) 10 MG tablet; Take 1 tablet by mouth Every Morning for 7 days. Indications: Generalized Anxiety Disorder, Major Depressive Disorder,  depression  Dispense: 7 tablet; Refill: 0          Visit Diagnoses:    ICD-10-CM ICD-9-CM   1.  depression in third trimester  O99.343 648.43    F32.A 311   2. Generalized anxiety disorder  F41.1 300.02         PLAN:  Safety: No acute safety concerns  Therapy: Currently Seeing a Therapist Fela Pride with [facility]  Risk Assessment: Risk of self-harm acutely is moderate.  Risk factors include anxiety disorder and mood disorder, current pregnancy with marital problems.  Protective factors include no family history, denies access to guns/weapons, no present SI, no history of suicide attempts or self-harm in the past, minimal AODA, healthcare seeking, future orientation, willingness to engage in care.  Risk of self-harm chronically is also moderate, but could be further elevated in the event of treatment noncompliance and/or AODA.  Meds:  Continue Escitalopram (LEXAPRO) 10 mg by mouth daily in the morning, as nightly dose caused insomnia, to target depression and anxiety.  Risks, benefits, alternatives discussed with patient including GI upset, nausea, vomiting, diarrhea, theoretical decrease of seizure threshold predisposing the patient to a slightly higher seizure risk, headaches, sexual dysfunction, serotonin syndrome, sweating, and bleeding risk. After discussion of these risks and benefits, the patient voiced understanding and agreed to proceed. New prescription sent for a 7 day supply to fill 24, as patient reports having 2 weeks remaining, as patient did not start until 23.  Patient to contact provider if  insurance will not cover 7 day supply, will then send for 30 days, expect to increase dose therefore only 7 day was ordered.   Labs: n/a  Updated Dr. Bowman on current POC as patient is pregnant.     Symptoms of anxiety, depression are under fair to good control with current medication regimen.  Due to duration of therapy with Lexapro for 2 weeks will plan on re-evaluating in 3 weeks and then determine if dose increase will be appropriate.  Patient is in agreement with plan.   Patient was given instructions and counseling regarding condition and for health maintenance advice. Please see specific information pulled into the AVS if appropriate.    Patient to contact provider if symptoms worsen or fail to improve.       12/7/23:  TELEPHONE  Called patient to discuss medication options after discussion with  yesterday, however, patient was driving on a road with poor service, patient has lunch from 1-2 pm today, and will call patient back around 115 pm today to discuss.      Called patient back and informed patient after speaking with , if the Zoloft is not working as effectively it is reasonable to switch to Lexapro as discussed previously or could also split the dose of Zoloft.     Patient instructed to Switch from Zoloft 100 mg by mouth nightly TO Escitalopram (LEXAPRO) 10 mg by mouth nightly to target depression and anxiety.  Risks, benefits, alternatives discussed with patient including GI upset, nausea, vomiting, diarrhea, theoretical decrease of seizure threshold predisposing the patient to a slightly higher seizure risk, headaches, sexual dysfunction, serotonin syndrome, sweating, and bleeding risk.    Risks, benefits, and potential side effects of SSRI's in pregnancy, including low risk of septal heart defects, drug discontinued syndrome of neonates-respiratory distress, cyanosis, seizure, respiratory distress, feeding difficulty, vomiting, hypoglycemia, tremors, irritability, jitteriness, and  constant crying- these problems were discussed in detail with the patient.  After discussion of these risks, as well as risk of untreated psychiatric illness, patient and examiner have mutually agreed to go forward with use of this medication during pregnancy.     Patient instructed to proceed with taking Zoloft tonight if Lexapro is unavailable from the pharmacy and to not take both medications on the same day.   Will schedule patient for an earlier visit on Wed 12/27/23 at 1140 via video, patient has lunch at 1230, though feels she will be able to adjust schedule and informed patient if unable to do so at scheduled time would wait for her to arrive by 1215 pm. Patient verbalized understanding. Patient instructed to contact provider directly if any thoughts of suicide occurred, negative intrusive thoughts, or intolerance to notify provider immediately.     Will update  of changes to POC.    12/6/23:  Safety: No acute safety concerns  Therapy: Currently Seeing a Therapist Fela Pride with [facility]  Patient educated and encouraged to continue therapy to develop new coping skills and more adaptive ways of thinking about problems. These tools can help make positive changes. The benefits of counseling often last long after treatment sessions have stopped.  Continue Zoloft 100 mg by mouth daily in the morning to target depression and anxiety.  Discussed all risks, benefits, alternatives, and side effects of Zoloft (Sertraline) including but not limited to GI upset, sexual dysfunction, bleeding risk, theoretical decrease of seizure threshold predisposing the patient to a slightly higher seizure risk, insomnia, sedation, dizziness, fatigue, drowsiness, activation of dari or hypomania, increased fragility fracture risk, hyponatremia, ocular effects, withdrawal syndrome following abrupt discontinuation, serotonin syndrome, and increased suicidality in patients 24 years and younger.  Patient educated on  the need to practice safe sex while taking this medication. Discussed the need for patient to immediately call the office for any new or worsening symptoms, such as worsening depression; feeling nervous or restless; suicidal thoughts or actions; or other changes in mood or behavior, and all other concerns. Patient  educated on medication compliance and the risks of suddenly stopping this medication or missing doses. Patient verbalized understanding and is agreeable to taking Sertraline. Addressed all questions and concerns. Patient informed Zoloft is safe in breastfeeding as it is present in the breastmilk at a concentration far less than 10% (the threshold).     Patient informed that going forward refills of current psychotropic medications previously prescribed by PCP will now be managed by this provider, and to contact provider MA INITIALLY when down to 5-7 days remaining to ensure new refill(s) will have this provider name on prescription for future refills. Explained to patient if requested from current bottle, via mychart, or via pharmacy the request would be directed to ordering provider. And to prevent confusion, inaccurate dosing, inaccurate medication refills, the management of psychotropic and/or mental health medications should only be ordered by this mental health provider. Patient verbalized understanding and agreed to proceed.    -Coping mechanisms discussed with patient today as a way to gain control over feelings to prevent situation or panic attack from getting worse: grounding techniques using 5 senses (name 3 things you can see, smell, touch, etc.), slow paced breathing techniques- focus on door inhaling and exhaling at each corner, application of cold compress/ice pack/water on face or opening freezer door to allow cold air to be breathed in taking slow deep breaths, closing eyes and focus on positive thoughts.   -Will discuss POC with  and contact patient later this evening regarding  potentially increasing Zoloft to 125 mg daily due to intolerance with 150 mg in the past, vs splitting doses as patient inquired. Patient denies need for refill today.     Patient presentation seems most consistent with anxiety and  depression due to marital stress.   Patient is at a higher risk of post partum depression due to current marital stressors and history of post partum depression with first child, which was managed well with Zoloft.  Switching to an alternative antidepressant during 3rd trimester of pregnancy would not be advised, however, will discuss further with Dr. Bowman. Patient is agreeable to plan.  Patient was given direct contact number to MA in [facility] number for any needs after 430 pm as an on call provider is available.   Patient was given instructions and counseling regarding condition and for health maintenance advice. Please see specific information pulled into the AVS if appropriate.   Patient to contact provider if symptoms worsen or fail to improve.      Patient screened positive for depression based on a PHQ-9 score of 11 on 2023. Follow-up recommendations include: Suicide Risk Assessment performed and continue with current antidepressant .         TREATMENT PLAN/GOALS: Continue supportive psychotherapy efforts and medications as indicated. Treatment and medication options discussed during today's visit. Patient ackowledged and verbally consented to continue with current treatment plan and was educated on the importance of compliance with treatment and follow-up appointments.    MEDICATION ISSUES:  NATALIE reviewed as expected.  Discussed medication options and treatment plan of prescribed medication as well as the risks, benefits, and side effects including potential falls, possible impaired driving and metabolic adversities among others. Patient is agreeable to call the office with any worsening of symptoms or onset of side effects. Patient is  agreeable to call 911 or go to the nearest ER should he/she begin having SI/HI. No medication side effects or related complaints today.     MEDS ORDERED DURING VISIT:  New Medications Ordered This Visit   Medications    escitalopram (Lexapro) 10 MG tablet     Sig: Take 1 tablet by mouth Every Morning for 7 days. Indications: Generalized Anxiety Disorder, Major Depressive Disorder,  depression     Dispense:  7 tablet     Refill:  0       Return in about 3 weeks (around 2024) for Video visit, medication check.  Patient is already scheduled for 24.        I spent 35 minutes caring for Grisel on this date of service. This time includes time spent by me in the following activities: preparing for the visit, obtaining and/or reviewing a separately obtained history, performing a medically appropriate examination and/or evaluation, counseling and educating the patient/family/caregiver, ordering medications, tests, or procedures, referring and communicating with other health care professionals, documenting information in the medical record, care coordination, and scheduling .      This document has been electronically signed by KENYA Day  2023 12:19 EST           Part of this note may be an electronic transcription/translation of spoken language to printed text using the Dragon Dictation System.

## 2023-12-27 NOTE — PATIENT INSTRUCTIONS
"1. Should you want to get in touch with your provider, KENYA Day, please contact MY Medical Assistant, Kristi, directly at [phone].  Recommend saving Kristi's direct number in phone as this is the PREFERRED & EASIEST way to get in contact with your provider.  Please leave a voice mail if you do not get an answer and she will return your call within 24 hrs. You will NOT be able to contact provider on [employer], as Behavioral Health Providers are restricted. YOU MUST CALL [phone]  If you need to speak with the on call provider after hours or on weekends, please Contact the [facility] ([phone]) and staff will be able to page the provider on call directly.     2.  In the event you need to cancel an appointment, please notify the office at least 24 hrs prior:   Contact **Kristi Medical Assistant at [facility] directly at [phone] or the [facility] ([phone])     3. MEDICATION REFILLS:  PLEASE CALL THE PHARMACY TO REQUEST ALL MEDICATION REFILLS or via "[employer]" TO ENSURE YOU ARE RECEIVING YOUR MEDICATIONS IN A TIMELY MANNER. The pharmacy or [employer] will send this request ELECTRONICALLY to the ordering provider.   IF YOU USE AN AUTOMATED SERVICE AT THE PHARMACY FOR REFILLS AND ARE TOLD THERE ARE \"NO REFILLS REMAINING\"   PLEASE CALL THE PHARMACY & SPEAK TO A LIVE PERSON TO VERIFY IT IS THE MOST UP TO DATE PRESCRIPTION ON FILE.    All new prescriptions will have a different number, therefore, if you were given refills for a medication today or at last visit it will not have the same number as the previous prescription.     4.  In the event you have personal crisis, contact the following crisis numbers: Suicide Prevention Hotline 1-571.807.5642 or *988, TOM Helpline 7-165-131-MIAZ; [facility] Emergency Room [phone]; text HELLO to 414219; or 911.  If you feel like harming yourself or others, call 911 right away.  You can call the 984 Suicide and Crisis " "Lifeline at  988   to speak with a counselor at the [facility], or you can connect with one using their online chat  .    5.  Never stop an antidepressant medicine without first talking to a healthcare provider. Suddenly stopping this type of medication can cause withdrawal symptoms.    6.  Counseling and talk therapy  Counseling or therapy teaches you new coping skills and more adaptive ways of thinking about problems. These tools can help you make positive changes. The benefits of counseling often last long after treatment sessions have stopped.    7.   We would appreciate your feedback, please scan the QRS code on the back of your appointment card (or see below) and complete a brief survey.  [place] location is still not available, so please click \"[place]\" location.  Thank you      SPECIFIC RECOMMENDATIONS:     1.      Medications discussed at this encounter:                   -  ordered a 7 day supply of Lexapro 10 mg to fill on Friday 1/5/24, which will end day of or after our visit already scheduled 1/16/24. If insurance has any issues with covering a 7 day supply, please contact my MA directly at number listed above and the order can be changed.     2.      Psychotherapy recommendations: Continue with current therapist.      3.     Return to clinic: 3 weeks, Tues. 1/16/24    Please arrive at least 15 minutes before your scheduled appointment time to complete check in process.      IF you are scheduled for a [employer] VIDEO visit, YOU MUST COMPLETE THE \"E-CHECK IN\" PROCESS PRIOR TO BEGINNING THE VISIT, YOU WILL NO LONGER RECEIVE A PHONE CALL PRIOR TO ALL VIDEO VISITS; You may still complete the E-Check in for in office visits prior to appointment, you will receive multiple text/email reminders which will direct you further if needed.           "

## 2024-01-03 NOTE — PROGRESS NOTES
OB FOLLOW UP      Chief Complaint   Patient presents with    Routine Prenatal Visit     Subjective:   No complaints    Objective:  /73   Wt 85.7 kg (189 lb)   LMP 05/29/2023   BMI 32.44 kg/m²  9.979 kg (22 lb) Facility age limit for growth %celso is 20 years.  See OB flow sheet for fundal height (not performed if US day of OV), FHT, edema, cvx exam if performed, and Upro/Uglu      Assessment and Plan:  32w1d     Diagnoses and all orders for this visit:    1. Supervision of other normal pregnancy, antepartum (Primary)  Overview:  BLAIRE finalized: 3/4/2024 by LMP and 7-week ultrasound    NIPS negative.  Declined CF     COVID: Fully vaccinated.  + Covid Sept 2023.  Flu: Vaccinated  Tdap: Vaccinated    ? Desires Sterilization:  Declined    Anatomy US: MFM Dr. Wray 10/10/23, nl anatomy, consistent w dates, no echo needed.  VTX,  Ant placenta.  FU US: 1/9/24 [facility] 4#15oz, 82%, AC 86%.  SOFÍA 12.8, VTX    PROBLEM LIST/PLAN:   Anxiety/depression/hx PPD- Lexapro 10mg.  Seeing therapist and counselor regularly.     1/9/2024 has had some thoughts of self harm- no active plans.  Rec call therapist for any active plans.  Covid in second tri- baby ASA continue, antepartum testing third tri, growth US third tri- done    Asthma, childhood-no medications.  Doing well  May 2022 LGSIL, HPV neg- first abn pap. Aug 2023 Pap neg HPV neg. Recommend repeat pap/cotest 3yrs per [facility]  Family history of congenital heart defect/clubbed foot  Nl anatomy ultrasound with MFM, echo not needed    Orders:  -     POC Urinalysis Dipstick      Counseling:    OB precautions, leaking, VB, good lugo vs PTL/Labor  FKC  EDEMA of lower extremities in pregnancy reviewed.  We discussed conservative options to include dietary changes, elevation, and compression stockings.        Reassuring pregnancy progress. Questions answered  Continue PNV.  Importance of healthy eating, obtaining sufficient sleep, and staying active unless hypertensive- activity  modified as directed.    Return in about 2 weeks (around 1/23/2024) for FU OB q2wks to 36weeks then weekly to BLAIRE.            Nidia Pollard,   01/09/2024    [facility]  [address] [place] [address]  Dept: [phone]  Dept Fax: [phone]  Loc: [phone]  Loc Fax: [phone]

## 2024-01-08 DIAGNOSIS — F32.A PERINATAL DEPRESSION IN THIRD TRIMESTER: ICD-10-CM

## 2024-01-08 DIAGNOSIS — F41.1 GENERALIZED ANXIETY DISORDER: ICD-10-CM

## 2024-01-08 DIAGNOSIS — O99.343 PERINATAL DEPRESSION IN THIRD TRIMESTER: ICD-10-CM

## 2024-01-08 RX ORDER — ESCITALOPRAM OXALATE 10 MG/1
10 TABLET ORAL NIGHTLY
Qty: 30 TABLET | OUTPATIENT
Start: 2024-01-08

## 2024-01-08 NOTE — TELEPHONE ENCOUNTER
ESCITALOPRAM 10MG TABLETS     Last refill: 12/7/2023     DUPLICATE ORDER PLEASE REVIEW     FOLLOW UP 01/16/2024

## 2024-01-09 ENCOUNTER — ROUTINE PRENATAL (OUTPATIENT)
Dept: OBSTETRICS AND GYNECOLOGY | Facility: CLINIC | Age: 29
End: 2024-01-09
Payer: COMMERCIAL

## 2024-01-09 VITALS — SYSTOLIC BLOOD PRESSURE: 113 MMHG | BODY MASS INDEX: 32.44 KG/M2 | WEIGHT: 189 LBS | DIASTOLIC BLOOD PRESSURE: 73 MMHG

## 2024-01-09 DIAGNOSIS — Z34.80 SUPERVISION OF OTHER NORMAL PREGNANCY, ANTEPARTUM: Primary | ICD-10-CM

## 2024-01-09 LAB
GLUCOSE UR STRIP-MCNC: NEGATIVE MG/DL
PROT UR STRIP-MCNC: NEGATIVE MG/DL

## 2024-01-09 PROCEDURE — 0502F SUBSEQUENT PRENATAL CARE: CPT | Performed by: OBSTETRICS & GYNECOLOGY

## 2024-01-16 ENCOUNTER — TELEMEDICINE (OUTPATIENT)
Dept: BEHAVIORAL HEALTH | Facility: CLINIC | Age: 29
End: 2024-01-16
Payer: COMMERCIAL

## 2024-01-16 DIAGNOSIS — F41.1 GENERALIZED ANXIETY DISORDER: Primary | ICD-10-CM

## 2024-01-16 DIAGNOSIS — Z63.0 MARITAL STRESS: ICD-10-CM

## 2024-01-16 DIAGNOSIS — F32.A PERINATAL DEPRESSION IN THIRD TRIMESTER: ICD-10-CM

## 2024-01-16 DIAGNOSIS — O99.343 PERINATAL DEPRESSION IN THIRD TRIMESTER: ICD-10-CM

## 2024-01-16 PROCEDURE — 99214 OFFICE O/P EST MOD 30 MIN: CPT | Performed by: NURSE PRACTITIONER

## 2024-01-16 RX ORDER — ESCITALOPRAM OXALATE 10 MG/1
15 TABLET ORAL EVERY MORNING
Qty: 135 TABLET | Refills: 0 | Status: SHIPPED | OUTPATIENT
Start: 2024-01-16 | End: 2024-04-15

## 2024-01-16 SDOH — SOCIAL STABILITY - SOCIAL INSECURITY: PROBLEMS IN RELATIONSHIP WITH SPOUSE OR PARTNER: Z63.0

## 2024-01-16 NOTE — PROGRESS NOTES
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  !---------------------------------------------------------!  ! Sex Chromosome Aneuploidies##  ! 96.2%      ! 99.7%     !  !---------------------------------------------------------!  * As reported in [facility] database nstd37  [https://www.ncbi.nlm.nih.gov/dbvar/studies/nstd37/ ]  # Estimated Sensitivity. Sensitivity estimated across the  observed size distribution of each syndrome [per                            ISCA  database nstd37] and across the range of fetal fractions  observed in routine clinical NIPT. Actual sensitivity can  also be influenced by other factors such as the size of the  event, total sequence counts, amplification bias, or  sequence bias.  ## [place] gestation only.    Limitations of the Test 08/21/2023 Comment   Final    Comment: While the results of these tests are highly reliable,  discordant results, including inaccurate fetal sex  prediction, may occur due to placental, maternal, or fetal  mosaicism or neoplasm; vanishing twin; prior maternal organ  transplant; or other causes. These tests are screening  tests and not diagnostic; they do not replace the accuracy  and precision of prenatal diagnosis with CVS or  amniocentesis. A  patient with a positive test result should  be referred for genetic counseling and offered invasive  prenatal diagnosis for confirmation of test results.[5] The  results of this testing, including the benefits and  limitations, should be discussed with a qualified  healthcare provider. Pregnancy management decisions,  including termination of the pregnancy, should not be based  on the results of these tests alone. The healthcare  provider is responsible for the use of this information in  the management of their patient.  Sex chromosomal  aneuploidies are not reportable for known                            multiple  gestations. A negative result does not ensure an unaffected  pregnancy nor does it exclude the possibility of other  chromosomal abnormalities or birth defects which are not a  part of these tests. An uninformative result may be  reported, the causes of which may include, but are not  limited to, insufficient sequencing coverage, noise or  artifacts in the region, amplification or sequencing bias,  or insufficient fetal fraction. These tests are not  intended to identify pregnancies at risk for neural tube  defects or ventral wall defects. Testing for whole  chromosome abnormalities (including sex chromosomes) and  for subchromosomal abnormalities could lead to the  potential discovery of both fetal and maternal genomic  abnormalities that could have major, minor, or no, clinical  significance. Evaluating the significance of a positive or  a non-reportable result may involve both invasive testing  and additional studies on the mother. Such investigations  may lead to a                            diagnosis of maternal chromosomal or  subchromosomal abnormalities, which on occasion may be  associated with benign or malignant maternal neoplasms.  These tests may not accurately identify fetal triploidy,  balanced rearrangements, or the precise location of  subchromosomal duplications or deletions; these may  be  detected by prenatal diagnosis with CVS or amniocentesis.  The ability to report results may be impacted by maternal  BMI, maternal weight, maternal systemic lupus erythematosus  (SLE) and/or by certain pharmaceutical agents such as low  molecular weight heparin (for example: Lovenox(R),  Xaparin(R), Clexane(R) and Fragmin(R)).    Note 08/21/2023 Comment   Final    See Notes  [employer] is a subsidiary of [employer], using the brand [employer]. This test was  developed and its performance characteristics determined by  [employer]. It has not been cleared or approved by the Food  and Drug Administration. This laboratory is certified under  the Clinical Laboratory Improvement Amendments (CLIA) as  qualified to perform high complexity clinical laboratory  testing and accredited by the College of American  Pathologists (CAP).  If there is future clinical need for adding [employer]  testing, this specimen will be available until term.  [place] samples will not be retained beyond 60 days.  [facility] patients will have to send a new sample for  re-sequencing ([facility] Test Code: 628588).    References 08/21/2023 Comment   Final    1. Shanna BUTTERFIELD, et al. Kaci Med. 2012;14(3):296-305.  2. Ping CURIEL, et al. Prenat Diag. 2013;33(6):591-597.  3. Rodger MILLAN, et al. Clin Chem. 2015 Apr;61(4):608-616.  4. Shanna BUTTERFIELD, et al. Kaci Med. 2011;13(11):913-920.  5. ACOG/SMFM Practice Bulletin No. 226, Oct 2020.    WBC 08/21/2023 8.26  3.40 - 10.80 10*3/mm3 Final    RBC 08/21/2023 4.62  3.77 - 5.28 10*6/mm3 Final    Hemoglobin 08/21/2023 13.4  12.0 - 15.9 g/dL Final    Hematocrit 08/21/2023 40.6  34.0 - 46.6 % Final    MCV 08/21/2023 87.9  79.0 - 97.0 fL Final    MCH 08/21/2023 29.0  26.6 - 33.0 pg Final    MCHC 08/21/2023 33.0  31.5 - 35.7 g/dL Final    RDW 08/21/2023 12.6  12.3 - 15.4 % Final    RDW-SD 08/21/2023 40.1  37.0 - 54.0 fl Final    MPV 08/21/2023 9.5  6.0 - 12.0 fL Final     Platelets 2023 311  140 - 450 10*3/mm3 Final    Neutrophil % 2023 77.4 (H)  42.7 - 76.0 % Final    Lymphocyte % 2023 16.9 (L)  19.6 - 45.3 % Final    Monocyte % 2023 4.4 (L)  5.0 - 12.0 % Final    Eosinophil % 2023 0.7  0.3 - 6.2 % Final    Basophil % 2023 0.2  0.0 - 1.5 % Final    Immature Grans % 2023 0.4  0.0 - 0.5 % Final    Neutrophils, Absolute 2023 6.39  1.70 - 7.00 10*3/mm3 Final    Lymphocytes, Absolute 2023 1.40  0.70 - 3.10 10*3/mm3 Final    Monocytes, Absolute 2023 0.36  0.10 - 0.90 10*3/mm3 Final    Eosinophils, Absolute 2023 0.06  0.00 - 0.40 10*3/mm3 Final    Basophils, Absolute 2023 0.02  0.00 - 0.20 10*3/mm3 Final    Immature Grans, Absolute 2023 0.03  0.00 - 0.05 10*3/mm3 Final    nRBC 2023 0.0  0.0 - 0.2 /100 WBC Final    Hepatitis B Surface Ag 2023 Non-Reactive  Non-Reactive Final    Rubella Antibodies, IgG 2023 1.50  Immune >0.99 index Final                                    Non-immune       <0.90                                  Equivocal  0.90 - 0.99                                  Immune           >0.99    ABO Type 2023 A   Final    RH type 2023 Positive   Final    Antibody Screen 2023 Negative   Final    HIV-1/ HIV-2 2023 Non-Reactive  Non-Reactive Final    Hepatitis C Ab 2023 Non-Reactive  Non-Reactive Final    Treponemal AB Total 2023 Non-Reactive  Non-Reactive Final       EKG Results:  No orders to display       Imaging Results:  No Images in the past 120 days found..      Assessment & Plan   Diagnoses and all orders for this visit:    1. Generalized anxiety disorder (Primary)  -     escitalopram (Lexapro) 10 MG tablet; Take 1.5 tablets by mouth Every Morning for 90 days. Indications: Generalized Anxiety Disorder, Major Depressive Disorder,  depression  Dispense: 135 tablet; Refill: 0    2.  depression in third trimester  -      escitalopram (Lexapro) 10 MG tablet; Take 1.5 tablets by mouth Every Morning for 90 days. Indications: Generalized Anxiety Disorder, Major Depressive Disorder,  depression  Dispense: 135 tablet; Refill: 0    3. Marital stress            Visit Diagnoses:    ICD-10-CM ICD-9-CM   1. Generalized anxiety disorder  F41.1 300.02   2.  depression in third trimester  O99.343 648.43    F32.A 311   3. Marital stress  Z63.0 V61.10           PLAN:  Safety: No acute safety concerns  Therapy: Currently Seeing a Therapist Fela Pride with [facility]  Risk Assessment: Risk of self-harm acutely is moderate.  Risk factors include anxiety disorder and mood disorder, current pregnancy with marital problems.  Protective factors include no family history, denies access to guns/weapons, no present SI, no history of suicide attempts or self-harm in the past, minimal AODA, healthcare seeking, future orientation, willingness to engage in care.  Risk of self-harm chronically is also moderate, but could be further elevated in the event of treatment noncompliance and/or AODA.  Meds:  INCREASE Escitalopram (LEXAPRO) FROM 10 mg TO 15 mg (instructed to take 1.5 tabs of the 10 mg) by mouth daily in the morning to target depression and anxiety.  Risks, benefits, alternatives discussed with patient including GI upset, nausea, vomiting, diarrhea, theoretical decrease of seizure threshold predisposing the patient to a slightly higher seizure risk, headaches, sexual dysfunction, serotonin syndrome, sweating, and bleeding risk. After discussion of these risks and benefits, the patient voiced understanding and agreed to proceed. 90 day supply sent to pharmacy.   Labs: n/a  Updated Dr. Bowman on current POC as patient is pregnant.   Patient given counseling list and alternative suggestions for spouse individual therapy as a resource.     Symptoms of anxiety, depression are under fair control with current medication regimen.  Due to  BLAIRE of 3/4/24, a 90 day supply of medication was sent, and will have patient return in 4 weeks, patient expressed interest in wanting to further increase to 20 mg if able to tolerate 15 mg due to past post partum depression and current stress with .  Will consider at follow up appointment.   Patient was given instructions and counseling regarding condition and for health maintenance advice. Please see specific information pulled into the AVS if appropriate.    Patient to contact provider if symptoms worsen or fail to improve.      12/27/23:   Therapy: Currently Seeing a Therapist Fela Pride with [facility]  -Continue Escitalopram (LEXAPRO) 10 mg by mouth daily in the morning, as nightly dose caused insomnia, to target depression and anxiety.    New prescription sent for a 7 day supply to fill 1/5/24, as patient reports having 2 weeks remaining, as patient did not start until 12/12/23.  Patient to contact provider if insurance will not cover 7 day supply, will then send for 30 days, expect to increase dose therefore only 7 day was ordered.     -Updated Dr. Bowman on current POC as patient is pregnant.     Symptoms of anxiety, depression are under fair to good control with current medication regimen.  Due to duration of therapy with Lexapro for 2 weeks will plan on re-evaluating in 3 weeks and then determine if dose increase will be appropriate.  Patient is in agreement with plan.   Patient was given instructions and counseling regarding condition and for health maintenance advice. Please see specific information pulled into the AVS if appropriate.    Patient to contact provider if symptoms worsen or fail to improve.       12/7/23:  TELEPHONE  Called patient to discuss medication options after discussion with  yesterday, however, patient was driving on a road with poor service, patient has lunch from 1-2 pm today, and will call patient back around 115 pm today to discuss.      Called patient  back and informed patient after speaking with , if the Zoloft is not working as effectively it is reasonable to switch to Lexapro as discussed previously or could also split the dose of Zoloft.     Patient instructed to Switch from Zoloft 100 mg by mouth nightly TO Escitalopram (LEXAPRO) 10 mg by mouth nightly to target depression and anxiety.  Risks, benefits, alternatives discussed with patient including GI upset, nausea, vomiting, diarrhea, theoretical decrease of seizure threshold predisposing the patient to a slightly higher seizure risk, headaches, sexual dysfunction, serotonin syndrome, sweating, and bleeding risk.    Risks, benefits, and potential side effects of SSRI's in pregnancy, including low risk of septal heart defects, drug discontinued syndrome of neonates-respiratory distress, cyanosis, seizure, respiratory distress, feeding difficulty, vomiting, hypoglycemia, tremors, irritability, jitteriness, and constant crying- these problems were discussed in detail with the patient.  After discussion of these risks, as well as risk of untreated psychiatric illness, patient and examiner have mutually agreed to go forward with use of this medication during pregnancy.     Patient instructed to proceed with taking Zoloft tonight if Lexapro is unavailable from the pharmacy and to not take both medications on the same day.   Will schedule patient for an earlier visit on Wed 12/27/23 at 1140 via video, patient has lunch at 1230, though feels she will be able to adjust schedule and informed patient if unable to do so at scheduled time would wait for her to arrive by 1215 pm. Patient verbalized understanding. Patient instructed to contact provider directly if any thoughts of suicide occurred, negative intrusive thoughts, or intolerance to notify provider immediately.     Will update  of changes to POC.    12/6/23:  Safety: No acute safety concerns  Therapy: Currently Seeing a Therapist Fela Pride  with [facility]  Patient educated and encouraged to continue therapy to develop new coping skills and more adaptive ways of thinking about problems. These tools can help make positive changes. The benefits of counseling often last long after treatment sessions have stopped.  Continue Zoloft 100 mg by mouth daily in the morning to target depression and anxiety.  Discussed all risks, benefits, alternatives, and side effects of Zoloft (Sertraline) including but not limited to GI upset, sexual dysfunction, bleeding risk, theoretical decrease of seizure threshold predisposing the patient to a slightly higher seizure risk, insomnia, sedation, dizziness, fatigue, drowsiness, activation of dari or hypomania, increased fragility fracture risk, hyponatremia, ocular effects, withdrawal syndrome following abrupt discontinuation, serotonin syndrome, and increased suicidality in patients 24 years and younger.  Patient educated on the need to practice safe sex while taking this medication. Discussed the need for patient to immediately call the office for any new or worsening symptoms, such as worsening depression; feeling nervous or restless; suicidal thoughts or actions; or other changes in mood or behavior, and all other concerns. Patient  educated on medication compliance and the risks of suddenly stopping this medication or missing doses. Patient verbalized understanding and is agreeable to taking Sertraline. Addressed all questions and concerns. Patient informed Zoloft is safe in breastfeeding as it is present in the breastmilk at a concentration far less than 10% (the threshold).     Patient informed that going forward refills of current psychotropic medications previously prescribed by PCP will now be managed by this provider, and to contact provider MA INITIALLY when down to 5-7 days remaining to ensure new refill(s) will have this provider name on prescription for future refills. Explained to patient if requested  from current bottle, via [employer], or via pharmacy the request would be directed to ordering provider. And to prevent confusion, inaccurate dosing, inaccurate medication refills, the management of psychotropic and/or mental health medications should only be ordered by this mental health provider. Patient verbalized understanding and agreed to proceed.    -Coping mechanisms discussed with patient today as a way to gain control over feelings to prevent situation or panic attack from getting worse: grounding techniques using 5 senses (name 3 things you can see, smell, touch, etc.), slow paced breathing techniques- focus on door inhaling and exhaling at each corner, application of cold compress/ice pack/water on face or opening freezer door to allow cold air to be breathed in taking slow deep breaths, closing eyes and focus on positive thoughts.   -Will discuss POC with  and contact patient later this evening regarding potentially increasing Zoloft to 125 mg daily due to intolerance with 150 mg in the past, vs splitting doses as patient inquired. Patient denies need for refill today.     Patient presentation seems most consistent with anxiety and  depression due to marital stress.   Patient is at a higher risk of post partum depression due to current marital stressors and history of post partum depression with first child, which was managed well with Zoloft.  Switching to an alternative antidepressant during 3rd trimester of pregnancy would not be advised, however, will discuss further with Dr. Bowman. Patient is agreeable to plan.  Patient was given direct contact number to MA in [facility] number for any needs after 430 pm as an on call provider is available.   Patient was given instructions and counseling regarding condition and for health maintenance advice. Please see specific information pulled into the AVS if appropriate.   Patient to contact provider if symptoms worsen or fail to  improve.      Patient screened positive for depression based on a PHQ-9 score of 11 on 2023. Follow-up recommendations include: Suicide Risk Assessment performed and continue with current antidepressant .         TREATMENT PLAN/GOALS: Continue supportive psychotherapy efforts and medications as indicated. Treatment and medication options discussed during today's visit. Patient ackowledged and verbally consented to continue with current treatment plan and was educated on the importance of compliance with treatment and follow-up appointments.    MEDICATION ISSUES:  NATALIE reviewed as expected.  Discussed medication options and treatment plan of prescribed medication as well as the risks, benefits, and side effects including potential falls, possible impaired driving and metabolic adversities among others. Patient is agreeable to call the office with any worsening of symptoms or onset of side effects. Patient is agreeable to call 911 or go to the nearest ER should he/she begin having SI/HI. No medication side effects or related complaints today.     MEDS ORDERED DURING VISIT:  New Medications Ordered This Visit   Medications    escitalopram (Lexapro) 10 MG tablet     Sig: Take 1.5 tablets by mouth Every Morning for 90 days. Indications: Generalized Anxiety Disorder, Major Depressive Disorder,  depression     Dispense:  135 tablet     Refill:  0       Return in about 4 weeks (around 2024) for Video visit, medication check.         I spent 37 minutes caring for Grisel on this date of service. This time includes time spent by me in the following activities: preparing for the visit, reviewing tests, obtaining and/or reviewing a separately obtained history, performing a medically appropriate examination and/or evaluation, counseling and educating the patient/family/caregiver, ordering medications, tests, or procedures, referring and communicating with other health care professionals, documenting  information in the medical record, care coordination, and scheduling .      This document has been electronically signed by KENYA Day  January 16, 2024 16:43 EST           Part of this note may be an electronic transcription/translation of spoken language to printed text using the Dragon Dictation System.

## 2024-01-16 NOTE — PATIENT INSTRUCTIONS
"1. Should you want to get in touch with your provider, KENYA Day, please contact MY Medical Assistant, Kristi, directly at [phone].  Recommend saving Kristi's direct number in phone as this is the PREFERRED & EASIEST way to get in contact with your provider.  Please leave a voice mail if you do not get an answer and she will return your call within 24 hrs. You will NOT be able to contact provider on DartPointshart, as Behavioral Health Providers are restricted. YOU MUST CALL [phone]  If you need to speak with the on call provider after hours or on weekends, please Contact the [facility] ([phone]) and staff will be able to page the provider on call directly.     2.  In the event you need to cancel an appointment, please notify the office at least 24 hrs prior:   Contact **Kristi Medical Assistant at [facility] directly at [phone] or the [facility] ([phone])     3. MEDICATION REFILLS:  PLEASE CALL THE PHARMACY TO REQUEST ALL MEDICATION REFILLS or via Runteq TO ENSURE YOU ARE RECEIVING YOUR MEDICATIONS IN A TIMELY MANNER. The pharmacy or built.io lisbet will send this request ELECTRONICALLY to the ordering provider.   IF YOU USE AN AUTOMATED SERVICE AT THE PHARMACY FOR REFILLS AND ARE TOLD THERE ARE \"NO REFILLS REMAINING\"   PLEASE CALL THE PHARMACY & SPEAK TO A LIVE PERSON TO VERIFY IT IS THE MOST UP TO DATE PRESCRIPTION ON FILE.    All new prescriptions will have a different number, therefore, if you were given refills for a medication today or at last visit it will not have the same number as the previous prescription.     4.  In the event you have personal crisis, contact the following crisis numbers: Suicide Prevention Hotline 1-513.638.7291 or *988, TOM Helpline 8-097-503-SLNE; [facility] Emergency Room [phone]; text HELLO to 917915; or 911.  If you feel like harming yourself or others, call 911 right away.  You can call the 986 Suicide and Crisis " "Lifeline at  988   to [facility] a [facility], or you can connect with one using their online chat  .    5.  Never stop an antidepressant medicine without first talking to a healthcare provider. Suddenly stopping this type of medication can cause withdrawal symptoms.    6.  Counseling and talk therapy  Counseling or therapy teaches you new coping skills and more adaptive ways of thinking about problems. These tools can help you make positive changes. The benefits of counseling often last long after treatment sessions have stopped.    7.   We would appreciate your feedback, please scan the QRS code on the back of your appointment card (or see below) and complete a brief survey.  [place] location is still not available, so please click \"[place]\" location.  Thank you      SPECIFIC RECOMMENDATIONS:     1.      Medications discussed at this encounter:                   -  Increase Lexapro from 10 to 15 mg  (take 1.5 tabs of the 10 mg) 90 day supply sent to pharmacy    2.      Psychotherapy recommendations: Continue with current therapist.      3.     Return to clinic: 4 weeks, Tues. 2/13/24 at 2 pm via video     For spouse:   Advised patient to contact insurance company to determine available therapist in area and/or check GlassUp and filter results based on needs.  List of local counselors. Check Bridestory, [facility] mental health services.     Counseling Services      [facility]  [address] [place] [address] [phone]  For appointments call, text, or email:  [email or web address]     Sandra Chang LCSW, [facility]  [address]   [place], [place]  [phone]     Silvana Burr LCSW in [place], [place] providing telehealth services currently, will be opening an office in [place].    For appointments request online:[email or web address]    [place] [facility]   They offer telehealth and accept most insurance plans. It's easy for " the patient to schedule through their website [email or web address]  Counseling and Psychiatry in [place]: Online or In-Person  [place] largest counseling center serves [place], [place], and the entire state with therapy, psychiatry, and case management.     [facility]  [address]  [place] [address]   [phone]  [facility]     [facility]  [address] [place] [address] [phone]  [email or web address]  Offers Children, Adolescent, & Adult therapy services.   Offers EMDR (Eye Movement Desensitization & Reprocessing) to help process traumatic experiences     [facility]   [address]   [place] [address] [phone]  [facility]  Specializing in the treatment of anxiety, depression, addiction and trauma, we work with children, teens, and adults in individual, couple, and family therapy.  Accept most commercial and Medicaid insurances and have private pay options.   Barbara Ruth, JALEN-S: Specializes in teen therapy, crisis intervention, women's issues, LGBTQIA+.   Antione Connelly, [facility]: Specializes in trauma and addiction; trained and certified in EMDR     [facility]  Address: [address] [place] [address], [place] [address]  Phone: [phone]  Website: [email or web address]  Services offered: Family, couples, and individual therapy. Clients ages 11 and up are seen for issues such as grief, low self-worth, adjustments, anxiety, trauma, marital and premarital, family, divorce, social skills, assertiveness skills, communication and conflict resolution skills.  Insurance accepted: [place], Humana, Cigna, Aetna, Optum, EAPs, , Medicare, Medicaid, and self-pay.    [facility]  Address: [address] [place] [address] [address], [facility] [address]  Phone: [phone]  Website:  [email or web address]  Services offered: Individual counseling for mood and anxiety disorders, PTSD and other trauma related issues, and substance-related disorders. Group counseling for anger management, domestic violence, parenting/parenting in recovery, and chemical dependence/substance abuse.  Insurance accepted: Shelley Edwards, Ashley, Edwardo, Richard, [facility], United. Accept all Medicaid, except for Aetna [facility] and [place] Medicaid.     [facility]  Address: [address] [address] [address] [place] [address], [phone]  Phone: [phone]  Website: www.[email or web address]  Services offered: Family, couples, and individual therapy for a wide variety of areas, such as anger management, LGBTQ support, crisis and conflict management, anxiety, depression, relationships, mood disorders, and more.  Insurance accepted: Medicaid, Medicare, and most commercial health insurances.    [facility] Y[facility]f [facility]  Address: [address] [facility] [address] [facility] [address], [facility] [address]   Phone: [phone]    Services offered: Family, couples, and individual therapy for a wide variety of areas, such as mood disorders, personality disorders, psychosis, addiction, anxiety, coping skills, grief, trauma and PTSD, transgender, self-harming, suicidal ideation, and other. Multiple types of therapy offered, including dialectical, trauma focused, and more.  Insurance accepted: Dutch Edwards BlueCross and BlueShield, CareSource, Humana, Medicaid, WellCare, Out of Network.    [facility]  Address: [address] [place] [address]  Phone: [phone] (11/4/21-temporary number until phone line fixed) [phone]  Services offered:  telehealth & in office visits, marriage & family therapy, art therapy, adult living skills, case management, applied behavioral analysis therapy      [facility]  Address: [address] [place] [address]  [address], [facility] [address]  Phone: [phone]  Website: www.[email or web address] CenterMobilisafeW[facility] Hospitalky.LanternCRM  Services offered: Individual and couples therapy, along with veterans group therapy and equine assisted psychotherapy. Providing service for depression, trauma, anxiety, abuse, phase of life changes, and more.  Insurance accepted: Aetna, [facility] [place] [facility], [place] EAP, [facility]  and EAP Services, Cigna,  One Source, Optum/[facility]/UMR, .    Raffi Richard, KAEL, [facility], [place] [address]  Phone: [phone]  Services offered: Individual and Group therapy, Alcohol use, anger management, anxiety, bipolar disorder, codependency, coping skills, depression, domestic abuse/violence, drug abuse, dual diagnosis, family conflict, grief, OCD, parenting, peer relationships, self-esteem stress, substance abuse, trauma & PTSD      [facility]  Address: [address] [facility] [facility] [facility] [address], [place] [address] (office is rear entrance on [address]).  -Peterson Gan [phone]  -Antonella Castillo [facility]. [phone]  Website: [email or web address]  Services offered: Psychotherapy and counseling for a wide variety of areas, such as depression, OCD, PTSD, and more. Neuropsychological testing  Insurance accepted: Accepts most insurance, including Medicare and Medicaid.      [facility]  Address: [address] [place] [address]  Phone: [phone]  Website: [email or web address]  Services offered: Individual, family, and group therapy. Also provide maternal mental health support.   Insurance accepted: [place]/[facility], Optum United, Cigna, Passport Medicaid, CareSource, Humana / [facility].    [facility]  Address: [address], [facility] [address]  Phone: [phone]  Website: [email or web address]  Services offered: Counseling for anxiety, depression, trauma,  "guidance on mindfulness and meditation, and more.  Insurance accepted: Accepts most major insurance plans, health savings accounts, and private pay. Documentation can be provided for you to submit to your insurance company for possible reimbursement.      Please arrive at least 15 minutes before your scheduled appointment time to complete check in process.      IF you are scheduled for a MyChart VIDEO visit, YOU MUST COMPLETE THE \"E-CHECK IN\" PROCESS PRIOR TO BEGINNING THE VISIT, YOU WILL NO LONGER RECEIVE A PHONE CALL PRIOR TO ALL VIDEO VISITS; You may still complete the E-Check in for in office visits prior to appointment, you will receive multiple text/email reminders which will direct you further if needed.           "

## 2024-01-22 NOTE — PROGRESS NOTES
OB FOLLOW UP      Chief Complaint   Patient presents with    Routine Prenatal Visit     Subjective:   Swelling in legs antonette if not using compression stockings    Objective:  /74   Wt 87.5 kg (193 lb)   LMP 05/29/2023   BMI 33.13 kg/m²  11.8 kg (26 lb) Facility age limit for growth %celso is 20 years.  See OB flow sheet for fundal height (not performed if US day of OV), FHT, edema, cvx exam if performed, and Upro/Uglu  Chaperone present during pelvic exam if performed.      Assessment and Plan:  34w1d     Diagnoses and all orders for this visit:    1. Supervision of other normal pregnancy, antepartum (Primary)  Overview:  BLAIRE finalized: 3/4/2024 by LMP and 7-week ultrasound    NIPS negative.  Declined CF     COVID: Fully vaccinated.  + Covid Sept 2023.  Flu: Vaccinated  Tdap: Vaccinated  RSV: Vaccinated [facility] 1/23/2024     ? Desires Sterilization:  Declined    Anatomy US: MFM Dr. Wray 10/10/23, nl anatomy, consistent w dates, no echo needed.  VTX,  Ant placenta.  FU US: 1/9/24 [facility] 4#15oz, 82%, AC 86%.  SOFÍA 12.8, VTX    PROBLEM LIST/PLAN:   Anxiety/depression/hx PPD- Lexapro 15mg.  Seeing therapist and counselor regularly.    Covid in second tri- baby ASA continue  antepartum testing third tri- scheduled weekly 1/23/2024   growth US third tri- done    Asthma, childhood-no medications.  Doing well  May 2022 LGSIL, HPV neg- first abn pap. Aug 2023 Pap neg HPV neg. Recommend repeat pap/cotest 3yrs per ASCCP  Family history of congenital heart defect/clubbed foot  Nl anatomy ultrasound with MFM, echo not needed    Orders:  -     POC Urinalysis Dipstick    2. COVID-19 affecting pregnancy, antepartum  Overview:  9/27/23 - COVID positive, recommend ASA 81 mg daily    Orders:  -     Fetal Nonstress Test; Standing    3. Encounter for immunization  -     ABRYSVO RSV Vaccine (pregnant women, 32-36 wks)    4. Need for RSV immunization  -     ABRYSVO RSV Vaccine (pregnant women, 32-36 wks)      Counseling:    OB  precautions, leaking, VB, good lugo vs PTL/Labor  FKC  EDEMA of upper extremities, intermittent numbness in fingers in pregnancy reviewed.  We discussed conservative options to include dietary changes, position changes, and OTC carpal tunnel braces.        Reassuring pregnancy progress. Questions answered  Continue PNV.  Importance of healthy eating, obtaining sufficient sleep, and staying active unless hypertensive- activity modified as directed.    Return in about 2 weeks (around 2/6/2024) for FU OB then weekly to BLAIRE, Schedule NSTs weekly.            Nidia Pollard,   01/23/2024    [facility]  [address] [place] [address]  Dept: [phone]  Dept Fax: [phone]  Loc: [phone]  Loc Fax: [phone]

## 2024-01-23 ENCOUNTER — ROUTINE PRENATAL (OUTPATIENT)
Dept: OBSTETRICS AND GYNECOLOGY | Facility: CLINIC | Age: 29
End: 2024-01-23
Payer: COMMERCIAL

## 2024-01-23 VITALS — SYSTOLIC BLOOD PRESSURE: 112 MMHG | BODY MASS INDEX: 33.13 KG/M2 | DIASTOLIC BLOOD PRESSURE: 74 MMHG | WEIGHT: 193 LBS

## 2024-01-23 DIAGNOSIS — Z29.11 NEED FOR RSV IMMUNIZATION: ICD-10-CM

## 2024-01-23 DIAGNOSIS — Z34.80 SUPERVISION OF OTHER NORMAL PREGNANCY, ANTEPARTUM: Primary | ICD-10-CM

## 2024-01-23 DIAGNOSIS — Z23 ENCOUNTER FOR IMMUNIZATION: ICD-10-CM

## 2024-01-23 DIAGNOSIS — O98.519 COVID-19 AFFECTING PREGNANCY, ANTEPARTUM: ICD-10-CM

## 2024-01-23 DIAGNOSIS — U07.1 COVID-19 AFFECTING PREGNANCY, ANTEPARTUM: ICD-10-CM

## 2024-01-23 LAB
GLUCOSE UR STRIP-MCNC: NEGATIVE MG/DL
PROT UR STRIP-MCNC: NEGATIVE MG/DL

## 2024-01-23 PROCEDURE — 0502F SUBSEQUENT PRENATAL CARE: CPT | Performed by: OBSTETRICS & GYNECOLOGY

## 2024-01-23 PROCEDURE — 90471 IMMUNIZATION ADMIN: CPT | Performed by: OBSTETRICS & GYNECOLOGY

## 2024-01-23 PROCEDURE — 90678 RSV VACC PREF BIVALENT IM: CPT | Performed by: OBSTETRICS & GYNECOLOGY

## 2024-01-24 ENCOUNTER — PREP FOR SURGERY (OUTPATIENT)
Dept: OTHER | Facility: HOSPITAL | Age: 29
End: 2024-01-24
Payer: COMMERCIAL

## 2024-01-30 ENCOUNTER — HOSPITAL ENCOUNTER (OUTPATIENT)
Facility: HOSPITAL | Age: 29
Discharge: HOME | End: 2024-01-30
Attending: OBSTETRICS & GYNECOLOGY | Admitting: OBSTETRICS & GYNECOLOGY
Payer: COMMERCIAL

## 2024-01-30 ENCOUNTER — HOSPITAL ENCOUNTER (OUTPATIENT)
Dept: LABOR AND DELIVERY | Facility: HOSPITAL | Age: 29
Discharge: HOME | End: 2024-01-30
Payer: COMMERCIAL

## 2024-01-30 VITALS — SYSTOLIC BLOOD PRESSURE: 118 MMHG | HEART RATE: 76 BPM | DIASTOLIC BLOOD PRESSURE: 56 MMHG

## 2024-01-30 PROCEDURE — 59025 FETAL NON-STRESS TEST: CPT | Performed by: OBSTETRICS & GYNECOLOGY

## 2024-01-30 PROCEDURE — 59025 FETAL NON-STRESS TEST: CPT

## 2024-01-30 NOTE — SIGNIFICANT NOTE
01/30/24 1704   Nonstress Test   Reason for NST Other (Comment)  (hx covid)   Acoustic Stimulator No   Uterine Irritability No   Contractions Irregular   Fetal Assessment   Fetal Movement active   Fetal HR Assessment Method external   Fetal HR (beats/min) 140  (140's)   Fetal HR Baseline normal range   Fetal HR Variability moderate (amplitude range 6 to 25 bpm)   Fetal HR Accelerations episodic;lasting at least 15 seconds;greater than/equal to 15 bpm   Fetal HR Decelerations absent   Sinusoidal Pattern Present absent   Fetal HR Tracing Category Category I   Interpretation A   Nonstress Test Interpretation A Reactive

## 2024-01-30 NOTE — NON STRESS TEST
Obstetrical Non-stress Test Interpretation     Name:  Grisel Logan  MRN: [id number]    28 y.o. female  at 35w1d    Indication: Hx Covid      Fetal Assessment  Fetal Movement: active  Fetal HR Assessment Method: external  Fetal HR (beats/min): 145  Fetal HR Baseline: normal range  Fetal HR Variability: moderate (amplitude range 6 to 25 bpm)  Fetal HR Accelerations: greater than/equal to 15 bpm, lasting at least 15 seconds  Fetal HR Decelerations: absent  Sinusoidal Pattern Present: absent    /56   Pulse 76   LMP 2023     Reason for test: Other (Comment) (Hx Covid)  Date of Test: 2024  Time frame of test: 4242-9382  RN NST Interpretation: Reactive      Xochilt Chakraborty RN  2024  15:40 EST

## 2024-01-31 NOTE — PROGRESS NOTES
OB FOLLOW UP      Chief Complaint   Patient presents with    Routine Prenatal Visit     Subjective:   No complaints    Objective:  /86   Wt 89.8 kg (198 lb)   LMP 05/29/2023   BMI 33.99 kg/m²  14.1 kg (31 lb) Facility age limit for growth %celso is 20 years.  See OB flow sheet for fundal height (not performed if US day of OV), FHT, edema, cvx exam if performed, and Upro/Uglu  Chaperone present during pelvic exam if performed.  GBS RV swab performed today    Assessment and Plan:  36w1d     Diagnoses and all orders for this visit:    1. Supervision of other normal pregnancy, antepartum (Primary)  Overview:  BLAIRE finalized: 3/4/2024 by LMP and 7-week ultrasound    NIPS negative.  Declined CF     COVID: Fully vaccinated.  + Covid Sept 2023.  Flu: Vaccinated  Tdap: Vaccinated  RSV: Vaccinated [facility] 1/23/24    ? Desires Sterilization:  Declined    Anatomy US: MFM Dr. Wray 10/10/23, nl anatomy, consistent w dates, no echo needed.  VTX,  Ant placenta.  FU US: 1/9/24 [facility] 4#15oz, 82%, AC 86%.  SOFÍA 12.8, VTX  FU  US: ordered 2/6/2024     PROBLEM LIST/PLAN:   Anxiety/depression/hx PPD- Lexapro 15mg.  Seeing therapist and counselor regularly.    Covid in second tri- baby ASA continue- stop 19Feb  antepartum testing third tri- scheduled weekly NSTs q Tues  growth US third tri  28Feb 39+2 AP IOL    Asthma, childhood-no medications.  Doing well  May 2022 LGSIL, HPV neg- first abn pap. Aug 2023 Pap neg HPV neg. Recommend repeat pap/cotest 3yrs per ASCCP  Family history of congenital heart defect/clubbed foot  Nl anatomy ultrasound with MFM, echo not needed    Orders:  -     POC Urinalysis Dipstick  -     Group B Strep (Molecular) - Swab, Vaginal/Rectum    2. Fetal size inconsistent with dates  -     US Ob 14 + Weeks Single or First Gestation; Future      Counseling:    OB precautions, leaking, VB, good lugo vs PTL/Labor  FKC  IOL scheduled    Reassuring pregnancy progress. Questions answered  Continue PNV.  Importance  of healthy eating, obtaining sufficient sleep, and staying active unless hypertensive- activity modified as directed.    Return in about 1 week (around 2/13/2024) for FU OB q1wk to BLAIRE/Delivery.  US next OV .            Nidia Pollard,   02/06/2024    [facility]  [address] [place] [address]  Dept: [phone]  Dept Fax: [phone]  Loc: [phone]  Loc Fax: [phone]

## 2024-02-06 ENCOUNTER — ROUTINE PRENATAL (OUTPATIENT)
Dept: OBSTETRICS AND GYNECOLOGY | Facility: CLINIC | Age: 29
End: 2024-02-06
Payer: COMMERCIAL

## 2024-02-06 ENCOUNTER — HOSPITAL ENCOUNTER (INPATIENT)
Facility: HOSPITAL | Age: 29
LOS: 3 days | Discharge: HOME | End: 2024-02-09
Attending: STUDENT IN AN ORGANIZED HEALTH CARE EDUCATION/TRAINING PROGRAM | Admitting: STUDENT IN AN ORGANIZED HEALTH CARE EDUCATION/TRAINING PROGRAM
Payer: COMMERCIAL

## 2024-02-06 ENCOUNTER — APPOINTMENT (OUTPATIENT)
Dept: ULTRASOUND IMAGING | Facility: HOSPITAL | Age: 29
End: 2024-02-06
Payer: COMMERCIAL

## 2024-02-06 ENCOUNTER — HOSPITAL ENCOUNTER (OUTPATIENT)
Dept: LABOR AND DELIVERY | Facility: HOSPITAL | Age: 29
Discharge: HOME | End: 2024-02-06
Payer: COMMERCIAL

## 2024-02-06 VITALS — BODY MASS INDEX: 33.99 KG/M2 | DIASTOLIC BLOOD PRESSURE: 86 MMHG | WEIGHT: 198 LBS | SYSTOLIC BLOOD PRESSURE: 125 MMHG

## 2024-02-06 DIAGNOSIS — O36.8390 NON-REASSURING ELECTRONIC FETAL MONITORING TRACING: ICD-10-CM

## 2024-02-06 DIAGNOSIS — Z34.80 SUPERVISION OF OTHER NORMAL PREGNANCY, ANTEPARTUM: Primary | ICD-10-CM

## 2024-02-06 DIAGNOSIS — O26.849 FETAL SIZE INCONSISTENT WITH DATES: ICD-10-CM

## 2024-02-06 PROBLEM — O28.3 ABNORMAL FETAL ULTRASOUND: Status: ACTIVE | Noted: 2024-02-06

## 2024-02-06 LAB
ABO GROUP BLD: NORMAL
AMPHET+METHAMPHET UR QL: NEGATIVE
BARBITURATES UR QL SCN: NEGATIVE
BENZODIAZ UR QL SCN: NEGATIVE
BLD GP AB SCN SERPL QL: NEGATIVE
CANNABINOIDS SERPL QL: NEGATIVE
COCAINE UR QL: NEGATIVE
DEPRECATED RDW RBC AUTO: 38.7 FL (ref 37–54)
ERYTHROCYTE [DISTWIDTH] IN BLOOD BY AUTOMATED COUNT: 12.8 % (ref 12.3–15.4)
FENTANYL UR-MCNC: NEGATIVE NG/ML
FLUAV SUBTYP SPEC NAA+PROBE: NOT DETECTED
FLUBV RNA ISLT QL NAA+PROBE: NOT DETECTED
GLUCOSE UR STRIP-MCNC: NEGATIVE MG/DL
HCT VFR BLD AUTO: 31.4 % (ref 34–46.6)
HGB BLD-MCNC: 10.2 G/DL (ref 12–15.9)
MCH RBC QN AUTO: 27.1 PG (ref 26.6–33)
MCHC RBC AUTO-ENTMCNC: 32.5 G/DL (ref 31.5–35.7)
MCV RBC AUTO: 83.3 FL (ref 79–97)
METHADONE UR QL SCN: NEGATIVE
OPIATES UR QL: NEGATIVE
OXYCODONE UR QL SCN: NEGATIVE
PLATELET # BLD AUTO: 262 10*3/MM3 (ref 140–450)
PMV BLD AUTO: 10 FL (ref 6–12)
PROT UR STRIP-MCNC: NEGATIVE MG/DL
RBC # BLD AUTO: 3.77 10*6/MM3 (ref 3.77–5.28)
RH BLD: POSITIVE
RSV RNA NPH QL NAA+NON-PROBE: NOT DETECTED
SARS-COV-2 RNA RESP QL NAA+PROBE: NOT DETECTED
T PALLIDUM IGG SER QL: NORMAL
T&S EXPIRATION DATE: NORMAL
WBC NRBC COR # BLD AUTO: 7.98 10*3/MM3 (ref 3.4–10.8)

## 2024-02-06 PROCEDURE — 85027 COMPLETE CBC AUTOMATED: CPT | Performed by: STUDENT IN AN ORGANIZED HEALTH CARE EDUCATION/TRAINING PROGRAM

## 2024-02-06 PROCEDURE — 86900 BLOOD TYPING SEROLOGIC ABO: CPT | Performed by: STUDENT IN AN ORGANIZED HEALTH CARE EDUCATION/TRAINING PROGRAM

## 2024-02-06 PROCEDURE — 25010000002 BETAMETHASONE ACET & SOD PHOS PER 4 MG: Performed by: STUDENT IN AN ORGANIZED HEALTH CARE EDUCATION/TRAINING PROGRAM

## 2024-02-06 PROCEDURE — 86780 TREPONEMA PALLIDUM: CPT | Performed by: STUDENT IN AN ORGANIZED HEALTH CARE EDUCATION/TRAINING PROGRAM

## 2024-02-06 PROCEDURE — G0378 HOSPITAL OBSERVATION PER HR: HCPCS

## 2024-02-06 PROCEDURE — 87653 STREP B DNA AMP PROBE: CPT | Performed by: OBSTETRICS & GYNECOLOGY

## 2024-02-06 PROCEDURE — 76819 FETAL BIOPHYS PROFIL W/O NST: CPT

## 2024-02-06 PROCEDURE — 80307 DRUG TEST PRSMV CHEM ANLYZR: CPT | Performed by: STUDENT IN AN ORGANIZED HEALTH CARE EDUCATION/TRAINING PROGRAM

## 2024-02-06 PROCEDURE — 86901 BLOOD TYPING SEROLOGIC RH(D): CPT | Performed by: STUDENT IN AN ORGANIZED HEALTH CARE EDUCATION/TRAINING PROGRAM

## 2024-02-06 PROCEDURE — 87637 SARSCOV2&INF A&B&RSV AMP PRB: CPT | Performed by: STUDENT IN AN ORGANIZED HEALTH CARE EDUCATION/TRAINING PROGRAM

## 2024-02-06 PROCEDURE — 25810000003 LACTATED RINGERS SOLUTION: Performed by: STUDENT IN AN ORGANIZED HEALTH CARE EDUCATION/TRAINING PROGRAM

## 2024-02-06 PROCEDURE — 86850 RBC ANTIBODY SCREEN: CPT | Performed by: STUDENT IN AN ORGANIZED HEALTH CARE EDUCATION/TRAINING PROGRAM

## 2024-02-06 RX ORDER — BETAMETHASONE SODIUM PHOSPHATE AND BETAMETHASONE ACETATE 3; 3 MG/ML; MG/ML
12 INJECTION, SUSPENSION INTRA-ARTICULAR; INTRALESIONAL; INTRAMUSCULAR; SOFT TISSUE EVERY 24 HOURS
Status: COMPLETED | OUTPATIENT
Start: 2024-02-06 | End: 2024-02-07

## 2024-02-06 RX ORDER — SODIUM CHLORIDE 0.9 % (FLUSH) 0.9 %
10 SYRINGE (ML) INJECTION AS NEEDED
Status: DISCONTINUED | OUTPATIENT
Start: 2024-02-06 | End: 2024-02-07

## 2024-02-06 RX ORDER — SODIUM CHLORIDE 9 MG/ML
40 INJECTION, SOLUTION INTRAVENOUS AS NEEDED
Status: DISCONTINUED | OUTPATIENT
Start: 2024-02-06 | End: 2024-02-07

## 2024-02-06 RX ORDER — FAMOTIDINE 10 MG/ML
20 INJECTION, SOLUTION INTRAVENOUS EVERY 12 HOURS SCHEDULED
Status: DISCONTINUED | OUTPATIENT
Start: 2024-02-06 | End: 2024-02-07

## 2024-02-06 RX ORDER — SODIUM CHLORIDE 0.9 % (FLUSH) 0.9 %
10 SYRINGE (ML) INJECTION EVERY 12 HOURS SCHEDULED
Status: DISCONTINUED | OUTPATIENT
Start: 2024-02-06 | End: 2024-02-07

## 2024-02-06 RX ORDER — CALCIUM CARBONATE 500 MG/1
2 TABLET, CHEWABLE ORAL 3 TIMES DAILY PRN
Status: DISCONTINUED | OUTPATIENT
Start: 2024-02-06 | End: 2024-02-07

## 2024-02-06 RX ORDER — ONDANSETRON 2 MG/ML
4 INJECTION INTRAMUSCULAR; INTRAVENOUS EVERY 6 HOURS PRN
Status: DISCONTINUED | OUTPATIENT
Start: 2024-02-06 | End: 2024-02-07

## 2024-02-06 RX ORDER — CITRIC ACID/SODIUM CITRATE 334-500MG
30 SOLUTION, ORAL ORAL ONCE AS NEEDED
Status: DISCONTINUED | OUTPATIENT
Start: 2024-02-06 | End: 2024-02-07

## 2024-02-06 RX ORDER — ACETAMINOPHEN 325 MG/1
650 TABLET ORAL EVERY 4 HOURS PRN
Status: DISCONTINUED | OUTPATIENT
Start: 2024-02-06 | End: 2024-02-07

## 2024-02-06 RX ORDER — ONDANSETRON 4 MG/1
4 TABLET, ORALLY DISINTEGRATING ORAL EVERY 6 HOURS PRN
Status: DISCONTINUED | OUTPATIENT
Start: 2024-02-06 | End: 2024-02-07

## 2024-02-06 RX ORDER — SODIUM CHLORIDE, SODIUM LACTATE, POTASSIUM CHLORIDE, CALCIUM CHLORIDE 600; 310; 30; 20 MG/100ML; MG/100ML; MG/100ML; MG/100ML
125 INJECTION, SOLUTION INTRAVENOUS CONTINUOUS
Status: DISCONTINUED | OUTPATIENT
Start: 2024-02-06 | End: 2024-02-07

## 2024-02-06 RX ADMIN — CALCIUM CARBONATE 2 TABLET: 500 TABLET, CHEWABLE ORAL at 20:05

## 2024-02-06 RX ADMIN — BETAMETHASONE SODIUM PHOSPHATE AND BETAMETHASONE ACETATE 12 MG: 3; 3 INJECTION, SUSPENSION INTRA-ARTICULAR; INTRALESIONAL; INTRAMUSCULAR at 18:54

## 2024-02-06 RX ADMIN — FAMOTIDINE 20 MG: 10 INJECTION INTRAVENOUS at 20:20

## 2024-02-06 RX ADMIN — SODIUM CHLORIDE, POTASSIUM CHLORIDE, SODIUM LACTATE AND CALCIUM CHLORIDE 1000 ML: 600; 310; 30; 20 INJECTION, SOLUTION INTRAVENOUS at 19:55

## 2024-02-07 ENCOUNTER — APPOINTMENT (OUTPATIENT)
Dept: ULTRASOUND IMAGING | Facility: HOSPITAL | Age: 29
End: 2024-02-07
Payer: COMMERCIAL

## 2024-02-07 ENCOUNTER — ANESTHESIA (OUTPATIENT)
Dept: LABOR AND DELIVERY | Facility: HOSPITAL | Age: 29
End: 2024-02-07
Payer: COMMERCIAL

## 2024-02-07 ENCOUNTER — ANESTHESIA EVENT (OUTPATIENT)
Dept: LABOR AND DELIVERY | Facility: HOSPITAL | Age: 29
End: 2024-02-07
Payer: COMMERCIAL

## 2024-02-07 PROBLEM — O36.8390 ABNORMAL FETAL HEART RATE OR RHYTHM AFFECTING MANAGEMENT OF MOTHER: Status: ACTIVE | Noted: 2024-02-07

## 2024-02-07 PROBLEM — O36.8130 DECREASED FETAL MOVEMENTS IN THIRD TRIMESTER: Status: ACTIVE | Noted: 2024-02-07

## 2024-02-07 LAB
BASE EXCESS BLDCOA CALC-SCNC: -4.7 MMOL/L (ref -2–2)
GROUP B STREP, DNA: NEGATIVE
HCO3 BLDCOA-SCNC: 23 MMOL/L
PCO2 BLDCOA: 52.7 MMHG (ref 33–49)
PH BLDCOA: 7.26 PH UNITS (ref 7.21–7.31)
PO2 BLDCOA: <40.5 MMHG

## 2024-02-07 PROCEDURE — 76819 FETAL BIOPHYS PROFIL W/O NST: CPT

## 2024-02-07 PROCEDURE — 59025 FETAL NON-STRESS TEST: CPT

## 2024-02-07 PROCEDURE — 82803 BLOOD GASES ANY COMBINATION: CPT | Performed by: OBSTETRICS & GYNECOLOGY

## 2024-02-07 PROCEDURE — 25010000002 MIDAZOLAM PER 1MG: Performed by: NURSE ANESTHETIST, CERTIFIED REGISTERED

## 2024-02-07 PROCEDURE — 25010000002 KETOROLAC TROMETHAMINE PER 15 MG: Performed by: OBSTETRICS & GYNECOLOGY

## 2024-02-07 PROCEDURE — 25010000002 BETAMETHASONE ACET & SOD PHOS PER 4 MG: Performed by: STUDENT IN AN ORGANIZED HEALTH CARE EDUCATION/TRAINING PROGRAM

## 2024-02-07 PROCEDURE — 25810000003 LACTATED RINGERS PER 1000 ML: Performed by: STUDENT IN AN ORGANIZED HEALTH CARE EDUCATION/TRAINING PROGRAM

## 2024-02-07 PROCEDURE — 25010000002 AZITHROMYCIN PER 500 MG: Performed by: OBSTETRICS & GYNECOLOGY

## 2024-02-07 PROCEDURE — 25010000002 BUPIVACAINE PF 0.75 % SOLUTION: Performed by: NURSE ANESTHETIST, CERTIFIED REGISTERED

## 2024-02-07 PROCEDURE — 25810000003 SODIUM CHLORIDE 0.9 % SOLUTION: Performed by: OBSTETRICS & GYNECOLOGY

## 2024-02-07 PROCEDURE — 25010000002 ONDANSETRON PER 1 MG: Performed by: STUDENT IN AN ORGANIZED HEALTH CARE EDUCATION/TRAINING PROGRAM

## 2024-02-07 PROCEDURE — 59510 CESAREAN DELIVERY: CPT | Performed by: OBSTETRICS & GYNECOLOGY

## 2024-02-07 PROCEDURE — 94799 UNLISTED PULMONARY SVC/PX: CPT

## 2024-02-07 PROCEDURE — 25010000002 MORPHINE PER 10 MG: Performed by: NURSE ANESTHETIST, CERTIFIED REGISTERED

## 2024-02-07 PROCEDURE — 25010000002 DIPHENHYDRAMINE PER 50 MG: Performed by: NURSE ANESTHETIST, CERTIFIED REGISTERED

## 2024-02-07 PROCEDURE — 25010000002 CEFAZOLIN IN DEXTROSE 2-4 GM/100ML-% SOLUTION: Performed by: OBSTETRICS & GYNECOLOGY

## 2024-02-07 PROCEDURE — 51702 INSERT TEMP BLADDER CATH: CPT

## 2024-02-07 PROCEDURE — 25010000002 OXYTOCIN PER 10 UNITS: Performed by: NURSE ANESTHETIST, CERTIFIED REGISTERED

## 2024-02-07 PROCEDURE — 88307 TISSUE EXAM BY PATHOLOGIST: CPT | Performed by: OBSTETRICS & GYNECOLOGY

## 2024-02-07 RX ORDER — PHENYLEPHRINE HCL IN 0.9% NACL 1 MG/10 ML
SYRINGE (ML) INTRAVENOUS AS NEEDED
Status: DISCONTINUED | OUTPATIENT
Start: 2024-02-07 | End: 2024-02-07 | Stop reason: SURG

## 2024-02-07 RX ORDER — HYDROMORPHONE HYDROCHLORIDE 2 MG/ML
0.25 INJECTION, SOLUTION INTRAMUSCULAR; INTRAVENOUS; SUBCUTANEOUS
Status: DISCONTINUED | OUTPATIENT
Start: 2024-02-07 | End: 2024-02-07

## 2024-02-07 RX ORDER — MIDAZOLAM HYDROCHLORIDE 2 MG/2ML
INJECTION, SOLUTION INTRAMUSCULAR; INTRAVENOUS AS NEEDED
Status: DISCONTINUED | OUTPATIENT
Start: 2024-02-07 | End: 2024-02-07 | Stop reason: SURG

## 2024-02-07 RX ORDER — KETOROLAC TROMETHAMINE 15 MG/ML
30 INJECTION, SOLUTION INTRAMUSCULAR; INTRAVENOUS ONCE
Status: COMPLETED | OUTPATIENT
Start: 2024-02-07 | End: 2024-02-07

## 2024-02-07 RX ORDER — BUPIVACAINE HYDROCHLORIDE 7.5 MG/ML
INJECTION, SOLUTION EPIDURAL; RETROBULBAR
Status: COMPLETED | OUTPATIENT
Start: 2024-02-07 | End: 2024-02-07

## 2024-02-07 RX ORDER — NALOXONE HCL 0.4 MG/ML
0.4 VIAL (ML) INJECTION ONCE AS NEEDED
Status: DISCONTINUED | OUTPATIENT
Start: 2024-02-07 | End: 2024-02-07

## 2024-02-07 RX ORDER — CEFAZOLIN SODIUM 2 G/100ML
2 INJECTION, SOLUTION INTRAVENOUS ONCE
Status: COMPLETED | OUTPATIENT
Start: 2024-02-07 | End: 2024-02-07

## 2024-02-07 RX ORDER — OXYCODONE HYDROCHLORIDE 5 MG/1
5 TABLET ORAL
Status: DISCONTINUED | OUTPATIENT
Start: 2024-02-07 | End: 2024-02-07

## 2024-02-07 RX ORDER — DIPHENHYDRAMINE HYDROCHLORIDE 50 MG/ML
12.5 INJECTION INTRAMUSCULAR; INTRAVENOUS EVERY 6 HOURS PRN
Status: DISCONTINUED | OUTPATIENT
Start: 2024-02-07 | End: 2024-02-07

## 2024-02-07 RX ORDER — OXYTOCIN/0.9 % SODIUM CHLORIDE 30/500 ML
999 PLASTIC BAG, INJECTION (ML) INTRAVENOUS ONCE
Status: COMPLETED | OUTPATIENT
Start: 2024-02-07 | End: 2024-02-07

## 2024-02-07 RX ORDER — MISOPROSTOL 200 UG/1
TABLET ORAL
Status: DISCONTINUED
Start: 2024-02-07 | End: 2024-02-07 | Stop reason: WASHOUT

## 2024-02-07 RX ORDER — FAMOTIDINE 10 MG/ML
20 INJECTION, SOLUTION INTRAVENOUS ONCE AS NEEDED
Status: COMPLETED | OUTPATIENT
Start: 2024-02-07 | End: 2024-02-07

## 2024-02-07 RX ORDER — HYDROMORPHONE HYDROCHLORIDE 2 MG/ML
0.5 INJECTION, SOLUTION INTRAMUSCULAR; INTRAVENOUS; SUBCUTANEOUS
Status: DISCONTINUED | OUTPATIENT
Start: 2024-02-07 | End: 2024-02-07

## 2024-02-07 RX ORDER — DIPHENHYDRAMINE HCL 25 MG
25 CAPSULE ORAL EVERY 6 HOURS PRN
Status: DISCONTINUED | OUTPATIENT
Start: 2024-02-07 | End: 2024-02-07

## 2024-02-07 RX ORDER — OXYTOCIN/0.9 % SODIUM CHLORIDE 30/500 ML
250 PLASTIC BAG, INJECTION (ML) INTRAVENOUS CONTINUOUS
Status: ACTIVE | OUTPATIENT
Start: 2024-02-07 | End: 2024-02-07

## 2024-02-07 RX ORDER — METHYLERGONOVINE MALEATE 0.2 MG/ML
INJECTION INTRAVENOUS
Status: DISCONTINUED
Start: 2024-02-07 | End: 2024-02-07 | Stop reason: WASHOUT

## 2024-02-07 RX ORDER — ESCITALOPRAM OXALATE 10 MG/1
15 TABLET ORAL ONCE
Status: COMPLETED | OUTPATIENT
Start: 2024-02-07 | End: 2024-02-07

## 2024-02-07 RX ORDER — ACETAMINOPHEN 500 MG
1000 TABLET ORAL ONCE
Status: COMPLETED | OUTPATIENT
Start: 2024-02-07 | End: 2024-02-07

## 2024-02-07 RX ORDER — MEPERIDINE HYDROCHLORIDE 25 MG/ML
12.5 INJECTION INTRAMUSCULAR; INTRAVENOUS; SUBCUTANEOUS
Status: DISCONTINUED | OUTPATIENT
Start: 2024-02-07 | End: 2024-02-07

## 2024-02-07 RX ORDER — OXYTOCIN 10 [USP'U]/ML
INJECTION, SOLUTION INTRAMUSCULAR; INTRAVENOUS AS NEEDED
Status: DISCONTINUED | OUTPATIENT
Start: 2024-02-07 | End: 2024-02-07 | Stop reason: SURG

## 2024-02-07 RX ORDER — MORPHINE SULFATE 0.5 MG/ML
INJECTION, SOLUTION EPIDURAL; INTRATHECAL; INTRAVENOUS
Status: COMPLETED | OUTPATIENT
Start: 2024-02-07 | End: 2024-02-07

## 2024-02-07 RX ADMIN — SODIUM CHLORIDE, POTASSIUM CHLORIDE, SODIUM LACTATE AND CALCIUM CHLORIDE: 600; 310; 30; 20 INJECTION, SOLUTION INTRAVENOUS at 21:06

## 2024-02-07 RX ADMIN — ONDANSETRON HYDROCHLORIDE 4 MG: 2 SOLUTION INTRAMUSCULAR; INTRAVENOUS at 21:04

## 2024-02-07 RX ADMIN — ACETAMINOPHEN 650 MG: 325 TABLET ORAL at 12:34

## 2024-02-07 RX ADMIN — CALCIUM CARBONATE 2 TABLET: 500 TABLET, CHEWABLE ORAL at 12:34

## 2024-02-07 RX ADMIN — OXYTOCIN 40 UNITS: 10 INJECTION, SOLUTION INTRAMUSCULAR; INTRAVENOUS at 21:04

## 2024-02-07 RX ADMIN — Medication 999 ML/HR: at 22:31

## 2024-02-07 RX ADMIN — FAMOTIDINE 20 MG: 10 INJECTION INTRAVENOUS at 19:51

## 2024-02-07 RX ADMIN — CALCIUM CARBONATE 2 TABLET: 500 TABLET, CHEWABLE ORAL at 17:24

## 2024-02-07 RX ADMIN — Medication 100 MCG: at 20:45

## 2024-02-07 RX ADMIN — CEFAZOLIN SODIUM 2 G: 2 INJECTION, SOLUTION INTRAVENOUS at 19:50

## 2024-02-07 RX ADMIN — AZITHROMYCIN 500 MG: 500 INJECTION, POWDER, LYOPHILIZED, FOR SOLUTION INTRAVENOUS at 19:50

## 2024-02-07 RX ADMIN — ESCITALOPRAM OXALATE 15 MG: 10 TABLET ORAL at 10:37

## 2024-02-07 RX ADMIN — ACETAMINOPHEN 1000 MG: 500 TABLET ORAL at 19:49

## 2024-02-07 RX ADMIN — BUPIVACAINE HYDROCHLORIDE 1.4 ML: 7.5 INJECTION, SOLUTION EPIDURAL; RETROBULBAR at 20:38

## 2024-02-07 RX ADMIN — DIPHENHYDRAMINE HYDROCHLORIDE 12.5 MG: 50 INJECTION, SOLUTION INTRAMUSCULAR; INTRAVENOUS at 22:25

## 2024-02-07 RX ADMIN — BETAMETHASONE SODIUM PHOSPHATE AND BETAMETHASONE ACETATE 12 MG: 3; 3 INJECTION, SUSPENSION INTRA-ARTICULAR; INTRALESIONAL; INTRAMUSCULAR at 18:41

## 2024-02-07 RX ADMIN — CALCIUM CARBONATE 2 TABLET: 500 TABLET, CHEWABLE ORAL at 02:34

## 2024-02-07 RX ADMIN — Medication 250 ML/HR: at 22:31

## 2024-02-07 RX ADMIN — KETOROLAC TROMETHAMINE 30 MG: 15 INJECTION, SOLUTION INTRAMUSCULAR; INTRAVENOUS at 22:30

## 2024-02-07 RX ADMIN — FAMOTIDINE 20 MG: 10 INJECTION INTRAVENOUS at 09:21

## 2024-02-07 RX ADMIN — MIDAZOLAM HYDROCHLORIDE 2 MG: 1 INJECTION, SOLUTION INTRAMUSCULAR; INTRAVENOUS at 21:12

## 2024-02-07 RX ADMIN — SODIUM CHLORIDE, POTASSIUM CHLORIDE, SODIUM LACTATE AND CALCIUM CHLORIDE 125 ML/HR: 600; 310; 30; 20 INJECTION, SOLUTION INTRAVENOUS at 12:34

## 2024-02-07 RX ADMIN — MORPHINE SULFATE 0.15 MG: 0.5 INJECTION, SOLUTION EPIDURAL; INTRATHECAL; INTRAVENOUS at 20:38

## 2024-02-07 NOTE — PROGRESS NOTES
Strip review    FHT: 160 baseline, moderate variability, absent accelerations, no decelerations; Cat I  TOCO: quiet    Reassuring fetal status with improvement of fetal tachycardia with IV hydration  Continuous monitoring  Plan for repeat BPP and second dose of steroids today.    Ning Mcfarland, DO

## 2024-02-07 NOTE — H&P
JEFFERY Chen  Obstetric History and Physical    Chief Complaint   Patient presents with    Non-stress Test       Subjective     Patient is a 28 y.o. female  currently at 36w1d, who presented for scheduled NST due to COVID in pregnancy.  Patient's NST was not reactive.  BPP was 6 out of 8.  Patient states she has been feeling like she is getting sick with a cold.  Denies any fevers.  She admits to feeling baby move.  Denies contractions, leakage of fluid or vaginal bleeding.    Her prenatal care is complicated by COVID in pregnancy, anxiety/depression, asthma..  Her previous obstetric/gynecological history is noted for is non-contributory.    The following portions of the patients history were reviewed and updated as appropriate: current medications, allergies, past medical history, past surgical history, past family history, past social history, and problem list .       Prenatal Information:  Prenatal Results       Initial Prenatal Labs       Test Value Reference Range Date Time    Hemoglobin  13.4 g/dL 12.0 - 15.9 23 1442    Hematocrit  40.6 % 34.0 - 46.6 23 1442    Platelets  311 10*3/mm3 140 - 450 23 1442    Rubella IgG  1.50 index Immune >0.99 23 1442    Hepatitis B SAg  Non-Reactive  Non-Reactive 23 1442    Hepatitis C Ab  Non-Reactive  Non-Reactive 23 1442    RPR        T. Pallidum Ab   Non-Reactive  Non-Reactive 23 1442    ABO  A   23 1442    Rh  Positive   23 1442    Antibody Screen  Negative   23 1442    HIV  Non-Reactive  Non-Reactive 23 1442    Urine Culture  No growth   23 1403    Gonorrhea  Negative  Negative 23 1403    Chlamydia  Negative  Negative 23 1403    TSH  0.867 m[iU]/L 0.270 - 4.200 20 1159    HgB A1c         Varicella IgG        HgB Electrophoresis         Cystic fibrosis                   Fetal testing        Test Value Reference Range Date Time    NIPT        MSAFP        AFP-4                  2nd  and 3rd Trimester       Test Value Reference Range Date Time    Hemoglobin (repeated)  12.2 g/dL 12.0 - 15.9 11/14/23 1405    Hematocrit (repeated)  36.2 % 34.0 - 46.6 11/14/23 1405    Platelets   313 10*3/mm3 140 - 450 11/14/23 1405       311 10*3/mm3 140 - 450 08/21/23 1442    GCT  81 mg/dL 65 - 139 11/14/23 1405    Antibody Screen (repeated)        Third Trimester syphilis screen (repeated)         GTT Fasting        GTT 1 Hr        GTT 2 Hr        GTT 3 Hr        Group B Strep                  Other testing        Test Value Reference Range Date Time    Parvo IgG         CMV IgG                   Drug Screening       Test Value Reference Range Date Time    Amphetamine Screen        Barbiturate Screen        Benzodiazepine Screen        Methadone Screen        Phencyclidine Screen        Opiates Screen        THC Screen        Cocaine Screen        Propoxyphene Screen        Buprenorphine Screen        Methamphetamine Screen        Oxycodone Screen        Tricyclic Antidepressants Screen                  Legend    ^: Historical                          External Prenatal Results       Pregnancy Outside Results - Transcribed From Office Records - See Scanned Records For Details       Test Value Date Time    ABO  A  08/21/23 1442    Rh  Positive  08/21/23 1442    Antibody Screen  Negative  08/21/23 1442    Varicella IgG ^ 0.8 AI 10/25/18 1106    Rubella  1.50 index 08/21/23 1442    Hgb  12.2 g/dL 11/14/23 1405       13.4 g/dL 08/21/23 1442    Hct  36.2 % 11/14/23 1405       40.6 % 08/21/23 1442    Glucose Fasting GTT       Glucose Tolerance Test 1 hour       Glucose Tolerance Test 3 hour       Gonorrhea (discrete)  Negative  08/21/23 1403    Chlamydia (discrete)  Negative  08/21/23 1403    RPR       VDRL       Syphilis Antibody       HBsAg  Non-Reactive  08/21/23 1442    Herpes Simplex Virus PCR       Herpes Simplex VIrus Culture       HIV  Non-Reactive  08/21/23 1442    Hep C RNA Quant PCR       Hep C Antibody   Non-Reactive  23 1442    AFP       Group B Strep       GBS Susceptibility to Clindamycin       GBS Susceptibility to Erythromycin       Fetal Fibronectin       Genetic Testing, Maternal Blood                 Drug Screening       Test Value Date Time    Urine Drug Screen       Amphetamine Screen       Barbiturate Screen       Benzodiazepine Screen       Methadone Screen       Phencyclidine Screen       Opiates Screen       THC Screen       Cocaine Screen       Propoxyphene Screen       Buprenorphine Screen       Methamphetamine Screen       Oxycodone Screen       Tricyclic Antidepressants Screen                 Legend    ^: Historical                             Past OB History:     OB History    Para Term  AB Living   3 1 1 0 1 1   SAB IAB Ectopic Molar Multiple Live Births   1 0 0 0 0 1      # Outcome Date GA Lbr Kj/2nd Weight Sex Delivery Anes PTL Lv   3 Current            2 SAB 23 6w0d    SAB         Birth Comments: Missed  8 weeks by LMP, 6 weeks by ultrasound, suction D &C   1 Term 19 40w0d  3345 g (7 lb 6 oz) M Vag-Spont  N MYCHAL       Past Medical History: Past Medical History:   Diagnosis Date    Anxiety     Depression     Incomplete uterovaginal prolapse 2022    Missed  2023    Added automatically from request for surgery [id number]    Scoliosis 2014    Viral warts 2021    Formatting of this note might be different from the original.  SAL ACID PLASTER      Past Surgical History Past Surgical History:   Procedure Laterality Date    CYST REMOVAL      bronchial cleft cyst    D & C WITH SUCTION N/A 2023    Procedure: DILATATION AND CURETTAGE WITH SUCTION;  Surgeon: Nidia Pollard DO;  Location: [facility] [place];  Service: Gynecology;  Laterality: N/A;    WISDOM TOOTH EXTRACTION        Family History: Family History   Problem Relation Age of Onset    Depression Mother     Anxiety disorder Mother     Hypertension Mother     Alcohol abuse  Father     Hypertension Father     Lung cancer Maternal Aunt     Breast cancer Maternal Aunt     Diabetes Maternal Grandfather     Hypertension Maternal Grandfather     Stomach cancer Maternal Grandfather     Lung cancer Maternal Grandfather     Cancer Maternal Grandmother         Pt unsure    Clotting disorder Maternal Grandmother     Alcohol abuse Paternal Grandfather     Prostate cancer Paternal Grandfather     Throat cancer Paternal Grandfather     Alcohol abuse Paternal Grandmother     Colon cancer Paternal Grandmother     Hypertension Paternal Grandmother     Stroke Paternal Grandmother     Clotting disorder Paternal Grandmother     Diabetes Maternal Great-Grandfather     Ovarian cancer Neg Hx     Uterine cancer Neg Hx       Social History:  reports that she has never smoked. She has never used smokeless tobacco.   reports that she does not currently use alcohol.   reports that she does not currently use drugs.        General ROS: Pertinent items are noted in HPI    Objective       Vital Signs Range for the last 24 hours  Temperature: Temp:  [98.1 °F (36.7 °C)-98.6 °F (37 °C)] 98.6 °F (37 °C)   Temp Source: Temp src: Oral   BP: BP: (104-125)/(63-86) 104/63   Pulse: Heart Rate:  [84-92] 84   Respirations: Resp:  [16] 16   SPO2: SpO2:  [98 %-100 %] 98 %   O2 Amount (l/min):     O2 Devices     Weight: Weight:  [89.8 kg (198 lb)] 89.8 kg (198 lb)     Physical Examination: General appearance - alert, well appearing, and in no distress  Mental status - alert, oriented to person, place, and time  Chest - clear to auscultation, no wheezes, rales or rhonchi, symmetric air entry  Heart - normal rate, regular rhythm, normal S1, S2, no murmurs, rubs, clicks or gallops  Abdomen - soft, nontender, nondistended, no masses or organomegaly  Pelvic - normal external genitalia, vulva, vagina, cervix, uterus and adnexa  Back exam - full range of motion, no tenderness, palpable spasm or pain on motion  Neurological - alert,  oriented, normal speech, no focal findings or movement disorder noted  Extremities - peripheral pulses normal, no pedal edema, no clubbing or cyanosis  Skin - normal coloration and turgor, no rashes, no suspicious skin lesions noted    Presentation: Vertex                       Fetal Heart Rate Assessment   Method: Fetal HR Assessment Method: external   Beats/min: Fetal HR (beats/min): 165   Baseline: Fetal HR Baseline: normal range   Variability: Fetal HR Variability: moderate (amplitude range 6 to 25 bpm)   Accels: Fetal HR Accelerations: absent   Decels: Fetal HR Decelerations: variable   Tracing Category:       Uterine Assessment   Method: Method: external tocotransducer, palpation   Frequency (min): Contraction Frequency (Minutes): occasional   Ctx Count in 10 min:     Duration:     Intensity:                 Ethel Units:       GBS is unknown      Assessment & Plan       Abnormal fetal ultrasound  BPP 6 out of 10, SOFÍA 11      Assessment:  1.  Intrauterine pregnancy at 36w1d gestation with nonreactive fetal status.    2.  BPP 6 out of 10  3.  Fetal tachycardia   4. Obstetrical history significant for is non-contributory.      Plan:  1.  Admit to L&D for observation, continuous fetal monitoring, IV hydration, betamethasone for fetal lung maturity, repeat BPP in 24 hours  2. Plan of care has been reviewed with patient and patient agrees.   3.  Risks, benefits of treatment plan have been discussed.  4.  All questions have been answered.    I discussed with patient that a BPP of 6 out of 10 is equivocal.  I recommend observation overnight with continuous monitoring.  I recommend betamethasone for fetal lung maturity in case of imminent delivery.  We will repeat the BPP in 24 hours and reevaluate the plan.  Patient is agreeable and expressed understanding.  All questions were answered.    Ning Mcfarland DO  2/6/2024  19:05 EST

## 2024-02-07 NOTE — PROGRESS NOTES
Progress Note       Grisel Logan is a 28 y.o.  at 36w2d who is admitted for observation due to nonreactive NST and BPP 6 out of 8. Patient was getting NST due to COVID in 2nd tri of pregnancy. Patient states she is still having a runny nose and feels like she is getting a cold.  She feels baby move.  Denies any leakage of fluid, vaginal bleeding.  No other complaints this morning.      Vitals:    24 0931   BP: 129/71   Pulse: 101   Resp:    Temp:    SpO2: 100%         Physical Exam:  HEENT:Normocephalic and atraumatic, NAD  Lungs: No labored breathing  Abdomen: Soft, gravid  Extremities: Negative swelling       Fetal Heart Rate Assessment   Method: Fetal HR Assessment Method: external   Beats/min: Fetal HR (beats/min): 155   Baseline: Fetal HR Baseline: normal range   Variability: Fetal HR Variability: moderate (amplitude range 6 to 25 bpm)   Accels: Fetal HR Accelerations: absent   Decels: Fetal HR Decelerations: absent   Tracing Category:       Uterine Assessment   Method: Method: external tocotransducer, palpation   Frequency (min): Contraction Frequency (Minutes): occasional   Ctx Count in 10 min:     Duration:     Intensity: Contraction Intensity: no contractions       Recent Results (from the past 24 hour(s))   Urine Drug Screen - Urine, Clean Catch    Collection Time: 24  6:59 PM    Specimen: Urine, Clean Catch   Result Value Ref Range    Amphet/Methamphet, Screen Negative Negative    Barbiturates Screen, Urine Negative Negative    Benzodiazepine Screen, Urine Negative Negative    Cocaine Screen, Urine Negative Negative    Opiate Screen Negative Negative    THC, Screen, Urine Negative Negative    Methadone Screen, Urine Negative Negative    Oxycodone Screen, Urine Negative Negative    Fentanyl, Urine Negative Negative   COVID-19, FLU A/B, RSV PCR 1 HR TAT - Swab, Nasopharynx    Collection Time: 24  7:45 PM    Specimen: Nasopharynx; Swab   Result Value Ref Range    COVID19 Not  Detected Not Detected - Ref. Range    Influenza A PCR Not Detected Not Detected    Influenza B PCR Not Detected Not Detected    RSV, PCR Not Detected Not Detected   Type & Screen    Collection Time: 02/06/24  7:57 PM    Specimen: Blood   Result Value Ref Range    ABO Type A     RH type Positive     Antibody Screen Negative     T&S Expiration Date 2/9/2024 11:59:59 PM    CBC (No Diff)    Collection Time: 02/06/24  7:57 PM    Specimen: Blood   Result Value Ref Range    WBC 7.98 3.40 - 10.80 10*3/mm3    RBC 3.77 3.77 - 5.28 10*6/mm3    Hemoglobin 10.2 (L) 12.0 - 15.9 g/dL    Hematocrit 31.4 (L) 34.0 - 46.6 %    MCV 83.3 79.0 - 97.0 fL    MCH 27.1 26.6 - 33.0 pg    MCHC 32.5 31.5 - 35.7 g/dL    RDW 12.8 12.3 - 15.4 %    RDW-SD 38.7 37.0 - 54.0 fl    MPV 10.0 6.0 - 12.0 fL    Platelets 262 140 - 450 10*3/mm3   T Pallidum Antibody w/ reflex RPR    Collection Time: 02/06/24  7:57 PM    Specimen: Blood   Result Value Ref Range    Treponemal AB Total Non-Reactive Non-Reactive       ASSESSMENT:    36 weeks 2 days gestation  Nonreactive NST  BPP 6 out of 8, SOFÍA 11  Fetal tachycardia    PLAN:  Repeat BPP today  Second dose of betamethasone  Continuous fetal monitoring    Discussed plan with the patient that we plan for repeat BPP today. We did discuss there is a possibility of induction if FHT are non-reassuring.  Patient is agreeable and expressed understanding.  I also discussed the case with the oncoming on-call physician.  The plan is for repeat BPP and possible consult with [facility]. Further plans to be determined.     Ning Mcfarland DO  2/7/2024 10:02 EST

## 2024-02-08 LAB
BASOPHILS # BLD AUTO: 0.02 10*3/MM3 (ref 0–0.2)
BASOPHILS NFR BLD AUTO: 0.1 % (ref 0–1.5)
DEPRECATED RDW RBC AUTO: 38.8 FL (ref 37–54)
EOSINOPHIL # BLD AUTO: 0 10*3/MM3 (ref 0–0.4)
EOSINOPHIL NFR BLD AUTO: 0 % (ref 0.3–6.2)
ERYTHROCYTE [DISTWIDTH] IN BLOOD BY AUTOMATED COUNT: 12.7 % (ref 12.3–15.4)
HCT VFR BLD AUTO: 30.6 % (ref 34–46.6)
HGB BLD-MCNC: 9.6 G/DL (ref 12–15.9)
IMM GRANULOCYTES # BLD AUTO: 0.13 10*3/MM3 (ref 0–0.05)
IMM GRANULOCYTES NFR BLD AUTO: 0.7 % (ref 0–0.5)
LYMPHOCYTES # BLD AUTO: 0.95 10*3/MM3 (ref 0.7–3.1)
LYMPHOCYTES NFR BLD AUTO: 5.2 % (ref 19.6–45.3)
MCH RBC QN AUTO: 26.6 PG (ref 26.6–33)
MCHC RBC AUTO-ENTMCNC: 31.4 G/DL (ref 31.5–35.7)
MCV RBC AUTO: 84.8 FL (ref 79–97)
MONOCYTES # BLD AUTO: 0.55 10*3/MM3 (ref 0.1–0.9)
MONOCYTES NFR BLD AUTO: 3 % (ref 5–12)
NEUTROPHILS NFR BLD AUTO: 16.76 10*3/MM3 (ref 1.7–7)
NEUTROPHILS NFR BLD AUTO: 91 % (ref 42.7–76)
NRBC BLD AUTO-RTO: 0 /100 WBC (ref 0–0.2)
PLATELET # BLD AUTO: 270 10*3/MM3 (ref 140–450)
PMV BLD AUTO: 10.5 FL (ref 6–12)
RBC # BLD AUTO: 3.61 10*6/MM3 (ref 3.77–5.28)
WBC NRBC COR # BLD AUTO: 18.41 10*3/MM3 (ref 3.4–10.8)

## 2024-02-08 PROCEDURE — 63710000001 DIPHENHYDRAMINE PER 50 MG: Performed by: NURSE ANESTHETIST, CERTIFIED REGISTERED

## 2024-02-08 PROCEDURE — 25010000002 NA FERRIC GLUC CPLX PER 12.5 MG: Performed by: OBSTETRICS & GYNECOLOGY

## 2024-02-08 PROCEDURE — 25010000002 KETOROLAC TROMETHAMINE PER 15 MG: Performed by: OBSTETRICS & GYNECOLOGY

## 2024-02-08 PROCEDURE — 25010000002 ONDANSETRON PER 1 MG: Performed by: OBSTETRICS & GYNECOLOGY

## 2024-02-08 PROCEDURE — 85025 COMPLETE CBC W/AUTO DIFF WBC: CPT | Performed by: OBSTETRICS & GYNECOLOGY

## 2024-02-08 PROCEDURE — 25810000003 SODIUM CHLORIDE 0.9 % SOLUTION: Performed by: OBSTETRICS & GYNECOLOGY

## 2024-02-08 RX ORDER — DIPHENHYDRAMINE HCL 25 MG
25 CAPSULE ORAL ONCE
Status: COMPLETED | OUTPATIENT
Start: 2024-02-08 | End: 2024-02-08

## 2024-02-08 RX ORDER — ACETAMINOPHEN 325 MG/1
650 TABLET ORAL EVERY 6 HOURS
Status: DISCONTINUED | OUTPATIENT
Start: 2024-02-09 | End: 2024-02-09 | Stop reason: HOSPADM

## 2024-02-08 RX ORDER — MISOPROSTOL 200 UG/1
200 TABLET ORAL EVERY 6 HOURS SCHEDULED
Status: COMPLETED | OUTPATIENT
Start: 2024-02-08 | End: 2024-02-08

## 2024-02-08 RX ORDER — OXYTOCIN/0.9 % SODIUM CHLORIDE 30/500 ML
125 PLASTIC BAG, INJECTION (ML) INTRAVENOUS CONTINUOUS PRN
Status: DISCONTINUED | OUTPATIENT
Start: 2024-02-08 | End: 2024-02-09 | Stop reason: HOSPADM

## 2024-02-08 RX ORDER — CALCIUM CARBONATE 500 MG/1
1 TABLET, CHEWABLE ORAL EVERY 4 HOURS PRN
Status: DISCONTINUED | OUTPATIENT
Start: 2024-02-08 | End: 2024-02-09 | Stop reason: HOSPADM

## 2024-02-08 RX ORDER — ALUMINA, MAGNESIA, AND SIMETHICONE 2400; 2400; 240 MG/30ML; MG/30ML; MG/30ML
15 SUSPENSION ORAL EVERY 4 HOURS PRN
Status: DISCONTINUED | OUTPATIENT
Start: 2024-02-08 | End: 2024-02-09 | Stop reason: HOSPADM

## 2024-02-08 RX ORDER — ESCITALOPRAM OXALATE 10 MG/1
20 TABLET ORAL DAILY
Status: DISCONTINUED | OUTPATIENT
Start: 2024-02-08 | End: 2024-02-09 | Stop reason: HOSPADM

## 2024-02-08 RX ORDER — IBUPROFEN 600 MG/1
600 TABLET ORAL EVERY 6 HOURS
Status: DISCONTINUED | OUTPATIENT
Start: 2024-02-08 | End: 2024-02-08

## 2024-02-08 RX ORDER — ONDANSETRON 2 MG/ML
4 INJECTION INTRAMUSCULAR; INTRAVENOUS EVERY 6 HOURS PRN
Status: DISCONTINUED | OUTPATIENT
Start: 2024-02-08 | End: 2024-02-09 | Stop reason: HOSPADM

## 2024-02-08 RX ORDER — IBUPROFEN 600 MG/1
600 TABLET ORAL EVERY 6 HOURS
Status: DISCONTINUED | OUTPATIENT
Start: 2024-02-09 | End: 2024-02-08

## 2024-02-08 RX ORDER — ONDANSETRON 4 MG/1
4 TABLET, ORALLY DISINTEGRATING ORAL EVERY 6 HOURS PRN
Status: DISCONTINUED | OUTPATIENT
Start: 2024-02-08 | End: 2024-02-09 | Stop reason: HOSPADM

## 2024-02-08 RX ORDER — FAMOTIDINE 20 MG/1
20 TABLET, FILM COATED ORAL DAILY
Status: DISCONTINUED | OUTPATIENT
Start: 2024-02-08 | End: 2024-02-09 | Stop reason: HOSPADM

## 2024-02-08 RX ORDER — OXYCODONE HYDROCHLORIDE 5 MG/1
5 TABLET ORAL EVERY 4 HOURS PRN
Status: DISCONTINUED | OUTPATIENT
Start: 2024-02-08 | End: 2024-02-09 | Stop reason: HOSPADM

## 2024-02-08 RX ORDER — OXYCODONE HYDROCHLORIDE 5 MG/1
10 TABLET ORAL EVERY 4 HOURS PRN
Status: DISCONTINUED | OUTPATIENT
Start: 2024-02-08 | End: 2024-02-09 | Stop reason: HOSPADM

## 2024-02-08 RX ORDER — HYDROCORTISONE 25 MG/G
1 CREAM TOPICAL 3 TIMES DAILY PRN
Status: DISCONTINUED | OUTPATIENT
Start: 2024-02-08 | End: 2024-02-09 | Stop reason: HOSPADM

## 2024-02-08 RX ORDER — ACETAMINOPHEN 500 MG
1000 TABLET ORAL EVERY 6 HOURS
Status: COMPLETED | OUTPATIENT
Start: 2024-02-08 | End: 2024-02-08

## 2024-02-08 RX ORDER — KETOROLAC TROMETHAMINE 15 MG/ML
15 INJECTION, SOLUTION INTRAMUSCULAR; INTRAVENOUS EVERY 6 HOURS
Status: DISPENSED | OUTPATIENT
Start: 2024-02-08 | End: 2024-02-09

## 2024-02-08 RX ORDER — IBUPROFEN 600 MG/1
600 TABLET ORAL EVERY 6 HOURS
Status: DISCONTINUED | OUTPATIENT
Start: 2024-02-08 | End: 2024-02-09 | Stop reason: HOSPADM

## 2024-02-08 RX ADMIN — MISOPROSTOL 200 MCG: 200 TABLET ORAL at 18:42

## 2024-02-08 RX ADMIN — ACETAMINOPHEN 1000 MG: 500 TABLET ORAL at 08:03

## 2024-02-08 RX ADMIN — MAGNESIUM HYDROXIDE 5 ML: 2400 SUSPENSION ORAL at 10:32

## 2024-02-08 RX ADMIN — KETOROLAC TROMETHAMINE 15 MG: 15 INJECTION, SOLUTION INTRAMUSCULAR; INTRAVENOUS at 05:22

## 2024-02-08 RX ADMIN — ACETAMINOPHEN 1000 MG: 500 TABLET ORAL at 14:59

## 2024-02-08 RX ADMIN — KETOROLAC TROMETHAMINE 15 MG: 15 INJECTION, SOLUTION INTRAMUSCULAR; INTRAVENOUS at 16:50

## 2024-02-08 RX ADMIN — ACETAMINOPHEN 1000 MG: 500 TABLET ORAL at 01:36

## 2024-02-08 RX ADMIN — ACETAMINOPHEN 1000 MG: 500 TABLET ORAL at 20:18

## 2024-02-08 RX ADMIN — MAGNESIUM HYDROXIDE 5 ML: 2400 SUSPENSION ORAL at 01:36

## 2024-02-08 RX ADMIN — IBUPROFEN 600 MG: 600 TABLET, FILM COATED ORAL at 22:22

## 2024-02-08 RX ADMIN — OXYCODONE 5 MG: 5 TABLET ORAL at 23:15

## 2024-02-08 RX ADMIN — OXYCODONE 10 MG: 5 TABLET ORAL at 01:37

## 2024-02-08 RX ADMIN — KETOROLAC TROMETHAMINE 15 MG: 15 INJECTION, SOLUTION INTRAMUSCULAR; INTRAVENOUS at 10:32

## 2024-02-08 RX ADMIN — MAGNESIUM HYDROXIDE 5 ML: 2400 SUSPENSION ORAL at 18:42

## 2024-02-08 RX ADMIN — DIPHENHYDRAMINE HYDROCHLORIDE 25 MG: 25 CAPSULE ORAL at 05:22

## 2024-02-08 RX ADMIN — MISOPROSTOL 200 MCG: 200 TABLET ORAL at 05:21

## 2024-02-08 RX ADMIN — ESCITALOPRAM OXALATE 20 MG: 10 TABLET ORAL at 08:03

## 2024-02-08 RX ADMIN — FAMOTIDINE 20 MG: 20 TABLET ORAL at 08:04

## 2024-02-08 RX ADMIN — SODIUM CHLORIDE 250 MG: 9 INJECTION, SOLUTION INTRAVENOUS at 10:32

## 2024-02-08 RX ADMIN — MISOPROSTOL 200 MCG: 200 TABLET ORAL at 01:36

## 2024-02-08 RX ADMIN — ONDANSETRON 4 MG: 2 INJECTION INTRAMUSCULAR; INTRAVENOUS at 13:56

## 2024-02-08 RX ADMIN — MISOPROSTOL 200 MCG: 200 TABLET ORAL at 12:44

## 2024-02-08 NOTE — PLAN OF CARE
Problem: Adult Inpatient Plan of Care  Goal: Plan of Care Review  2024 by Paras Coates RN  Outcome: Ongoing, Progressing  2024 by Paras Coates RN  Outcome: Ongoing, Progressing  Goal: Patient-Specific Goal (Individualized)  2024 by Paras Coates RN  Outcome: Ongoing, Progressing  2024 by Paras Coates RN  Outcome: Ongoing, Progressing  Goal: Absence of Hospital-Acquired Illness or Injury  2024 by Paras Coates RN  Outcome: Ongoing, Progressing  2024 by Paras Coates RN  Outcome: Ongoing, Progressing  Intervention: Prevent and Manage VTE (Venous Thromboembolism) Risk  Recent Flowsheet Documentation  Taken 2024 1032 by Paras Coates RN  Activity Management: ambulated to bathroom  Goal: Optimal Comfort and Wellbeing  2024 by Paras Coates RN  Outcome: Ongoing, Progressing  2024 by Paras Coates RN  Outcome: Ongoing, Progressing  Goal: Readiness for Transition of Care  2024 by Paras Coates RN  Outcome: Ongoing, Progressing  2024 by Paras Coates RN  Outcome: Ongoing, Progressing     Problem: Fall Injury Risk  Goal: Absence of Fall and Fall-Related Injury  2024 by Paras Coates RN  Outcome: Ongoing, Progressing  2024 by Paras Coates RN  Outcome: Ongoing, Progressing     Problem: Bleeding ( Delivery)  Goal: Bleeding is Controlled  Outcome: Ongoing, Progressing     Problem: Change in Fetal Wellbeing ( Delivery)  Goal: Stable Fetal Wellbeing  Outcome: Ongoing, Progressing     Problem: Infection ( Delivery)  Goal: Absence of Infection Signs and Symptoms  Outcome: Ongoing, Progressing     Problem: Respiratory Compromise ( Delivery)  Goal: Effective Oxygenation and Ventilation  Outcome: Ongoing, Progressing   Goal Outcome Evaluation:

## 2024-02-08 NOTE — PROGRESS NOTES
"JEFFERY Chen  Obstetric Intrapartum Progress Note    Subjective:  Pt is without complaints.  She does admit to feeling some fetal movement but not as much as normal.    Objective:  BP: (110-132)/(54-73) 125/69     Flowsheet Rows      Flowsheet Row First Filed Value   Admission Height 162.6 cm (64\") Documented at 02/06/2024 2028   Admission Weight 89.8 kg (198 lb) Documented at 02/06/2024 2028            Intake/Output last 24 hours:  No intake or output data in the 24 hours ending 02/07/24 1910    Intake/Output this shift:  No intake/output data recorded.    FHR Tracing:  Baseline: Normal 110-160 bpm  Variability:   Moderate/Normal (amplitude 6-25 bpm)  Accelerations: Absent   Decelerations: intermittent  Contractions:  Not keya    Physical Exam:  General appearance: alert and in no distress  Cervix: Dilation: 1cm              Effacement: Not effaced              Station: -3              Presentation: cephalic    BPP 6/8 (-2 breathing SOFÍA 13)      Assessment:  Category II  Non-reassuring fetus    Plan:  I have reviewed the tracing, BPP information with the patient and Dr Wray LANCE.  We discussed that patient has been in hospital now x24 hours with an essentially non reactive tracing. The patient has received 1 dose of steroids and is set to receive her second dose. Due to the essentially unchanged BPP and fetal tracing, Dr Wray and I agree that this patient warrants delivery.  I reviewed this with the patient, her mom and .  We discussed mode of delivery.  The patient has had a vaginal delivery.  The patient has an unfavorable cervix and is remote from delivery. I do not believe we should use cytotec due to the possibility of hyperstimulation and possible need for emergent delivery due to fetal distress.  We could try pitocin and possible balloon vs c section. The patient desires to proceed with c section.  We discussed that baby is 36 weeks and may require NICU admission and possible surfactant.  We " discussed the risks of c section to include infection, bleeding, damage to baby,uterus, bowels, bladder other internal organs requiring additional surgery to repair or remove any damaged structure as well as possible need for blood transfusion.  The patient and her family were given the opportunity to ask questions and their questions were answered to their satisfaction.  I will ask for the neonatologist to be in attendance for this delivery.    Uzma Hudson, DO 2/7/2024 @NOW @

## 2024-02-08 NOTE — ANESTHESIA PREPROCEDURE EVALUATION
Anesthesia Evaluation     no history of anesthetic complications:   NPO Solid Status: > 6 hours  NPO Liquid Status: > 2 hours           Airway   Mallampati: II  TM distance: >3 FB  Neck ROM: full  No difficulty expected  Dental - normal exam     Pulmonary - negative pulmonary ROS and normal exam    breath sounds clear to auscultation  Cardiovascular - negative cardio ROS and normal exam  Exercise tolerance: good (4-7 METS)    Rhythm: regular  Rate: normal        Neuro/Psych  (+) psychiatric history Depression and Anxiety  GI/Hepatic/Renal/Endo    (+) obesity, GERD    Musculoskeletal     Abdominal  - normal exam   Substance History - negative use     OB/GYN    (+) Pregnant        Other - negative ROS                   Anesthesia Plan    ASA 2     spinal       Anesthetic plan, risks, benefits, and alternatives have been provided, discussed and informed consent has been obtained with: patient.    Plan discussed with CRNA.    CODE STATUS:    Code Status (Patient has no pulse and is not breathing): CPR (Attempt to Resuscitate)  Medical Interventions (Patient has pulse or is breathing): Full Support

## 2024-02-08 NOTE — ANESTHESIA PROCEDURE NOTES
Spinal Block    Pre-sedation assessment completed: 2/7/2024 8:42 PM    Patient reassessed immediately prior to procedure    Patient location during procedure: OB  Start Time: 2/7/2024 8:35 PM  Stop Time: 2/7/2024 8:45 PM  Indication:post-op pain management and procedure for pain  Performed By  CRNA/CAA: Brittany Silverio, CRNA  Preanesthetic Checklist  Completed: patient identified, IV checked, site marked, risks and benefits discussed, surgical consent, monitors and equipment checked, pre-op evaluation and timeout performed  Spinal Block Prep:  Patient Position:sitting  Sterile Tech:cap, gloves, sterile barriers and mask  Prep:Betadine  Patient Monitoring:blood pressure monitoring, continuous pulse oximetry and EKG    Spinal Block Procedure  Approach:midline  Guidance:landmark technique and palpation technique  Location:L4-L5  Needle Type:Pencan  Needle Gauge:24 G  Placement of Spinal needle event:cerebrospinal fluid aspirated  Paresthesia: no  Fluid Appearance:clear  Medications: bupivacaine PF (MARCAINE) injection 0.75% - Intrathecal   1.4 mL - 2/7/2024 8:38:00 PM  morphine PF (DURAMORPH) injection - Intrathecal   0.15 mg - 2/7/2024 8:38:00 PM   Post Assessment  Patient Tolerance:patient tolerated the procedure well with no apparent complications  Complications no

## 2024-02-08 NOTE — L&D DELIVERY NOTE
SECTION PRIMARY  Procedure Report    Patient Name:  Grisel Logan  YOB: 1995    Date of Surgery:  2024     Indications:  Non reassuring fetal testing      Abnormal fetal ultrasound    Decreased fetal movements in third trimester    Abnormal fetal heart rate or rhythm affecting management of mother       Pre-op Diagnosis:   IUP@36.2 weeks  Hx Covid during Pregnancy  Non Reassuring Fetal Testing       Post-Op Diagnosis Codes:   Same with Meconium Stained Fluid    Procedure/CPT® Codes:      Procedure(s):   SECTION PRIMARY    Staff:  Surgeon(s):  Uzma Hudson DO Kahleifeh, Basim, MD         Anesthesia: Spinal    Estimated Blood Loss:  650 mL    Implants:    Nothing was implanted during the procedure    Specimen:          Specimens       ID Source Type Tests Collected By Collected At Frozen?    A Placenta Tissue TISSUE PATHOLOGY EXAM   Uzma Hudson DO 24     This specimen was not marked as sent.                Findings: thick Meconium Stained Fluid                   Viable Male    Complications: None    Description of Procedure: Procedure Details   After the appropriate consents had been obtained, the patient was taken to Operative Suite and placed in the sitting positions for placement of  spinal anesthesia. After the spinal was placed, she was placed in the supine position with left lateral uterine displacement.  Fetal heart tones were obtained and noted to be in the normal range.A Time Out was held and the above information confirmed.     After induction of anesthesia, the patient was draped and prepped in the usual sterile manner. A Pfannenstiel incision was made and carried down through the subcutaneous tissue to the fascia. Fascial incision was made and extended transversely. The fascia was  from the underlying rectus tissue superiorly and inferiorly. The peritoneum was identified and entered. Peritoneal incision was extended longitudinally. The  bladder was retracted behind the bladder blade.The utero-vesical peritoneal reflection was incised transversely and the bladder flap was bluntly freed from the lower uterine segment. A low transverse uterine incision was made. A viable male  was delivered @2102 hours. The infant was bulb suctioned prior to delivery of the shoulders. The cord was clamped x2 after 15 seconds. The weight was 6 The  apgar scores of 4 at one minute and 8  at five minutes. A specimen for the arterial cord gas and cord blood was obtained. The placenta was removed intact and appeared normal and was sent to pathology for analysis. The uterine outline, tubes and ovaries appeared normal. The uterine incision was closed with running locked sutures of 0 Vicryl and imbricated in a second layer with a suture of 0-Vicryl.there was still some bleeding that was contained using figure of eights with 0-Vicryl suture.Hemostasis was observed. The posterior cul de sac was irrigated.The uterus was returned to the pelvis. The pelvic gutters were irrigated and all remaining blood clots were removed. The patient was noted to be draining clear urine. The rectus muscles were reapproximated in an interrupted fashion using a suture of 0-Vicryl. The fascia was then reapproximated with running sutures of 0 Vicryl. Melquiades's fascia was reapproximated loosely using  3-0 chromic.The skin was reapproximated in a subcuticular fashion using 4-0 Kwill.  A pressure dressing was applied.       Instrument, sponge, and needle counts were correct prior the abdominal closure and at the conclusion of the case.              Uzma Hudson DO     Date: 2024  Time: 21:57 EST

## 2024-02-08 NOTE — PROGRESS NOTES
"JEFFERY Chen   PROGRESS NOTE    Post-Op Day 1 S/P     Subjective:  Patient complains of very tired and overwhelmed  Pain controlled  Tolerating a regular diet  Passing flatus  Ambulating  Carvalho just removed, no void yet  Lochia decreasing, no bleeding concerns  Denies HA, vision change, or RUQ/epigastric pain  Baby in NICU at [facility]    Objective    Objective:  Vital signs (most recent): Blood pressure 121/76, pulse 66, temperature 97.7 °F (36.5 °C), temperature source Oral, resp. rate 16, height 162.6 cm (64\"), weight 89.8 kg (198 lb), last menstrual period 2023, SpO2 98%, not currently breastfeeding.       Temp:  [97.7 °F (36.5 °C)-98.5 °F (36.9 °C)] 97.7 °F (36.5 °C)  Heart Rate:  [] 66  Resp:  [16-18] 16  BP: (120-140)/(55-84) 121/76     Physical Exam:  GEN: alert and in no distress and looks tired  Abdomen: Dressing dry and intact  abdomen soft + BS's  Abdominal binder in place  Incision: dressed  Extremities: Sushant's sign negative bilaterally, no redness or tenderness in the calves or thighs     Labs:  Lab Results (last 24 hours)       Procedure Component Value Units Date/Time    Blood Gas, Arterial, Cord [[id number]]  (Abnormal) Collected: 24    Specimen: Cord Blood Arterial from Umbilical Cord Updated: 24     pH, Cord Arterial 7.26 pH Units      pCO2, Cord Arterial 52.7 mmHg      Base Exc, Cord Arterial -4.7 mmol/L      pO2, Cord Arterial <40.5 mmHg      HCO3, Cord Arterial 23.0 mmol/L     CBC & Differential [[id number]]  (Abnormal) Collected: 24 0551    Specimen: Blood Updated: 24 0636    Narrative:      The following orders were created for panel order CBC & Differential.  Procedure                               Abnormality         Status                     ---------                               -----------         ------                     CBC Auto Differential[[id number]]        Abnormal            Final result                 Please view " results for these tests on the individual orders.    CBC Auto Differential [[id number]]  (Abnormal) Collected: 24 0551    Specimen: Blood Updated: 24 0636     WBC 18.41 10*3/mm3      RBC 3.61 10*6/mm3      Hemoglobin 9.6 g/dL      Hematocrit 30.6 %      MCV 84.8 fL      MCH 26.6 pg      MCHC 31.4 g/dL      RDW 12.7 %      RDW-SD 38.8 fl      MPV 10.5 fL      Platelets 270 10*3/mm3      Neutrophil % 91.0 %      Lymphocyte % 5.2 %      Monocyte % 3.0 %      Eosinophil % 0.0 %      Basophil % 0.1 %      Immature Grans % 0.7 %      Neutrophils, Absolute 16.76 10*3/mm3      Lymphocytes, Absolute 0.95 10*3/mm3      Monocytes, Absolute 0.55 10*3/mm3      Eosinophils, Absolute 0.00 10*3/mm3      Basophils, Absolute 0.02 10*3/mm3      Immature Grans, Absolute 0.13 10*3/mm3      nRBC 0.0 /100 WBC                Assessment & Plan        Abnormal fetal ultrasound    Decreased fetal movements in third trimester    Abnormal fetal heart rate or rhythm affecting management of mother    Meconium stained amniotic fluid, delivered, current hospitalization    Assessment & Plan    Assessment:    Grisel Logan is Day 1  post-partum  , Low Transverse   .      Plan:    Routine postpartum/postop care    Remove ordoñez, Ambulate, Saline lock IV, Remove bandage, Shower, PO pain meds, Importance of wound care/keep clean and dry, Breast feeding support        Uzma Hudson DO  24  08:14 EST

## 2024-02-08 NOTE — ANESTHESIA POSTPROCEDURE EVALUATION
Patient: Grisel Logan    Procedure Summary       Date: 24 Room / Location: [facility] LABOR DELIVERY  [facility] LABOR DELIVERY    Anesthesia Start:  Anesthesia Stop:     Procedure:  SECTION PRIMARY (Abdomen) Diagnosis:     Surgeons: Uzma Hudson DO Provider: Brittany Silverio CRNA    Anesthesia Type: spinal ASA Status: 2            Anesthesia Type: spinal    Vitals  Vitals Value Taken Time   /76 24   Temp 36.5 °C (97.7 °F) 24   Pulse 66 2422   Resp 16 24   SpO2 99 % 249   Vitals shown include unfiled device data.        Post Anesthesia Care and Evaluation    Patient location during evaluation: bedside  Patient participation: complete - patient participated  Level of consciousness: awake  Pain score: 1  Pain management: adequate    Airway patency: patent  Anesthetic complications: No anesthetic complications  PONV Status: none  Cardiovascular status: acceptable  Respiratory status: acceptable  Hydration status: acceptable  Post Neuraxial Block status: Motor and sensory function returned to baseline and No signs or symptoms of PDPHNo anesthesia care post op

## 2024-02-09 VITALS
BODY MASS INDEX: 33.8 KG/M2 | HEART RATE: 89 BPM | TEMPERATURE: 98 F | SYSTOLIC BLOOD PRESSURE: 129 MMHG | OXYGEN SATURATION: 98 % | RESPIRATION RATE: 18 BRPM | HEIGHT: 64 IN | WEIGHT: 198 LBS | DIASTOLIC BLOOD PRESSURE: 67 MMHG

## 2024-02-09 PROBLEM — O36.8390 NON-REASSURING ELECTRONIC FETAL MONITORING TRACING: Status: ACTIVE | Noted: 2024-02-09

## 2024-02-09 PROCEDURE — 0503F POSTPARTUM CARE VISIT: CPT | Performed by: OBSTETRICS & GYNECOLOGY

## 2024-02-09 RX ORDER — OXYCODONE HYDROCHLORIDE 5 MG/1
5 TABLET ORAL EVERY 6 HOURS PRN
Qty: 12 TABLET | Refills: 0 | Status: SHIPPED | OUTPATIENT
Start: 2024-02-09 | End: 2024-02-13

## 2024-02-09 RX ORDER — IBUPROFEN 600 MG/1
600 TABLET ORAL EVERY 6 HOURS
Qty: 30 TABLET | Refills: 1 | Status: SHIPPED | OUTPATIENT
Start: 2024-02-09

## 2024-02-09 RX ORDER — ACETAMINOPHEN 325 MG/1
650 TABLET ORAL EVERY 6 HOURS
Qty: 30 TABLET | Refills: 2 | Status: SHIPPED | OUTPATIENT
Start: 2024-02-09

## 2024-02-09 RX ADMIN — FAMOTIDINE 20 MG: 20 TABLET ORAL at 09:48

## 2024-02-09 RX ADMIN — IBUPROFEN 600 MG: 600 TABLET, FILM COATED ORAL at 04:36

## 2024-02-09 RX ADMIN — IBUPROFEN 600 MG: 600 TABLET, FILM COATED ORAL at 09:47

## 2024-02-09 RX ADMIN — ESCITALOPRAM OXALATE 20 MG: 10 TABLET ORAL at 09:47

## 2024-02-09 RX ADMIN — MAGNESIUM HYDROXIDE 5 ML: 2400 SUSPENSION ORAL at 01:51

## 2024-02-09 RX ADMIN — ACETAMINOPHEN 650 MG: 325 TABLET ORAL at 01:51

## 2024-02-09 RX ADMIN — MAGNESIUM HYDROXIDE 5 ML: 2400 SUSPENSION ORAL at 09:48

## 2024-02-09 RX ADMIN — OXYCODONE 5 MG: 5 TABLET ORAL at 11:31

## 2024-02-09 NOTE — PLAN OF CARE
Problem: Adult Inpatient Plan of Care  Goal: Plan of Care Review  Outcome: Met  Goal: Patient-Specific Goal (Individualized)  Outcome: Met  Goal: Absence of Hospital-Acquired Illness or Injury  Outcome: Met  Intervention: Identify and Manage Fall Risk  Recent Flowsheet Documentation  Taken 2/9/2024 1131 by Xochilt Chakraborty RN  Safety Promotion/Fall Prevention: safety round/check completed  Taken 2/9/2024 0900 by Xochilt Chakraborty RN  Safety Promotion/Fall Prevention: safety round/check completed  Taken 2/9/2024 0700 by Xochilt Chakraborty RN  Safety Promotion/Fall Prevention: safety round/check completed  Intervention: Prevent and Manage VTE (Venous Thromboembolism) Risk  Recent Flowsheet Documentation  Taken 2/9/2024 0947 by Xochilt Chakraborty RN  Activity Management: ambulated to bathroom  VTE Prevention/Management: compression stockings on  Goal: Optimal Comfort and Wellbeing  Outcome: Met  Intervention: Monitor Pain and Promote Comfort  Recent Flowsheet Documentation  Taken 2/9/2024 1131 by Xochilt Chakraborty RN  Pain Management Interventions: see MAR  Taken 2/9/2024 0947 by Xochilt Chakraborty RN  Pain Management Interventions: see MAR  Intervention: Provide Person-Centered Care  Recent Flowsheet Documentation  Taken 2/9/2024 0947 by Xochilt Chakraborty RN  Trust Relationship/Rapport:   care explained   choices provided   questions answered   reassurance provided   thoughts/feelings acknowledged  Goal: Readiness for Transition of Care  Outcome: Met     Problem: Fall Injury Risk  Goal: Absence of Fall and Fall-Related Injury  Outcome: Met  Intervention: Promote Injury-Free Environment  Recent Flowsheet Documentation  Taken 2/9/2024 1131 by Xochilt Chakraborty RN  Safety Promotion/Fall Prevention: safety round/check completed  Taken 2/9/2024 0900 by Xochilt Chakraborty RN  Safety Promotion/Fall Prevention: safety round/check completed  Taken 2/9/2024 0700 by Xochilt Chakraborty RN  Safety Promotion/Fall Prevention: safety round/check completed      Problem: Breastfeeding  Goal: Effective Breastfeeding  Outcome: Met     Problem: Adjustment to Role Transition (Postpartum  Delivery)  Goal: Successful Maternal Role Transition  Outcome: Met     Problem: Bleeding (Postpartum  Delivery)  Goal: Hemostasis  Outcome: Met     Problem: Infection (Postpartum  Delivery)  Goal: Absence of Infection Signs and Symptoms  Outcome: Met     Problem: Pain (Postpartum  Delivery)  Goal: Acceptable Pain Control  Outcome: Met  Intervention: Prevent or Manage Pain  Recent Flowsheet Documentation  Taken 2024 1131 by Xochilt Chakraborty, RN  Pain Management Interventions: see MAR  Taken 2024 0947 by Xochilt Chakraborty, RN  Pain Management Interventions: see MAR     Problem: Postoperative Nausea and Vomiting (Postpartum  Delivery)  Goal: Nausea and Vomiting Relief  Outcome: Met     Problem: Postoperative Urinary Retention (Postpartum  Delivery)  Goal: Effective Urinary Elimination  Outcome: Met   Goal Outcome Evaluation:

## 2024-02-09 NOTE — PROGRESS NOTES
" Chen   PROGRESS NOTE    Post-Op Day 2 S/P     Subjective:  Patient has no complaints  Pain controlled  Tolerating a regular diet  Passing flatus  Ambulating  Urinating spontaneously  Lochia decreasing, no bleeding concerns  Denies HA, vision change, or RUQ/epigastric pain  No lightheadedness or dizziness  Baby in NICU at [facility] desires dc to home to see baby    Objective    Objective:  Vital signs (most recent): Blood pressure 121/69, pulse 67, temperature 97.7 °F (36.5 °C), temperature source Oral, resp. rate 20, height 162.6 cm (64\"), weight 89.8 kg (198 lb), last menstrual period 2023, SpO2 98%, not currently breastfeeding.       Temp:  [97.3 °F (36.3 °C)-98.1 °F (36.7 °C)] 97.7 °F (36.5 °C)  Heart Rate:  [67-93] 67  Resp:  [16-20] 20  BP: (111-123)/(69-75) 121/69     Physical Exam:  GEN: alert and in no distress  Abdomen: Incision dry and intact  fundus firm - below the umbilicus  abdomen soft + BS's  Incision: clean, dry, and intact, well approximated  Extremities:Sushant's sign negative bilaterally, no redness or tenderness in the calves or thighs    Labs:  Lab Results (last 24 hours)       ** No results found for the last 24 hours. **               Assessment & Plan        Abnormal fetal ultrasound    Decreased fetal movements in third trimester    Abnormal fetal heart rate or rhythm affecting management of mother    Meconium stained amniotic fluid, delivered, current hospitalization    Assessment & Plan    Assessment:    Grisel Logan is Day 2  post-partum  , Low Transverse   .      Plan:    Routine postpartum/postop care    PO pain meds, Importance of wound care/keep clean and dry, Breast feeding support, Discharge home, DC meds reviewed, Follow up scheduled, PP/PO precautions given, HTN precautions reviewed in detail.  Questions answered.  RTO/ER for HA not relieved w tylenol, vision changes, epig/RUQ pain, or Bps elevated at home.    Baby in NICU at [facility] will dc to " home    Uzma Hudson DO  02/09/24  10:18 EST

## 2024-02-09 NOTE — SIGNIFICANT NOTE
notified by RN that FLORENTIN is discharging today.  contacted patient via room phone, as she is eating lunch and preferred a phone call for check-in. Baby is currently at [facility]. Oldest child age 5. Good family support identified.  reviewed chart and patient is taking Lexapro. FLORENTIN speaks freely and honestly about prior hx of postpartum depression. FLORENTIN is currently seeing a therapist every other week and a psychiatrist monthly. FLORENTIN denies need for new provivder information and has good rapport with existing providers.  encouraged FLORENTIN and complimented the efforts she is taking to prioritize her mental health. FLORENTIN is looking forward to visiting her baby at [facility]. MOB seemingly very appropriate and attentive to existing needs. She has been encouraged to reach out should she have questions.

## 2024-02-09 NOTE — PLAN OF CARE
Goal Outcome Evaluation:   Ongoing, Progressing: FF, light rubra. Tolerating PO fluids and solids well. Voiding without difficulty. Passing flatus. Showered this shift and clothes/linens changed. Tolerating pain well with ATC medications and only one additional prn medication tonight. Incision is VIJAYA with sides well approximated, no redness, no drainage. Abdominal binder in use and SCDs in use bilaterally. Thigh-high TEDS in use per patient's request/insistence.

## 2024-02-09 NOTE — LACTATION NOTE
LC in to follow up with lactation progress. Patient is pumping regularly and storing the EBM in freezer to prepare in for transport. She has been consistently getting 3-4 ml/pumping session. Mom is planning on discharge today. LC discussed storage and cleaning guidelines for the NICU and how to bring milk to the NICU (on ice). LC reminded mom the importance of pumping both breasts every 3 hours. LC discussed importance that pumping should not be painful and expected breast changes and management of engorgement.LC discussed checking to make sure new medications are safe to breastfeed. LC discussed alcohol use and cigarette/second hand smoke around baby and breastfeeding and discussed the impact of street drugs on infants and breastfeeding. LC used the page in the breastfeeding guide to discuss harmful effects of these. Breastfeeding/Lactation expectations and anticipatory guidance discussed for the next two weeks . LC discussed nipple care, plugged ducts, engorgement, and breast infection.  Mom demonstrated good understanding

## 2024-02-09 NOTE — DISCHARGE SUMMARY
"             [facility]         DISCHARGE SUMMARY    Patient Name: Grisel Logan  : 1995  MRN: [id number]    Date of Admission: 2024  Date of Discharge:  2024   Primary Care Physician: Ayde Martins APRN    Consults       No orders found from 2024 to 2024.             Procedures:  Primary Low Transverse CS  Biophysical Profile x2    Presenting Problem:   Abnormal fetal ultrasound [O28.3]  Non-reassuring electronic fetal monitoring tracing [O36.8390]    Admitting Diagnosis:  IUP@36 weeks 2 days  Non Reassuring Fetal Testing  Abnormal Fetal Ultrasound    Discharge Diagnosis:  Same  Meconium Stained Amniotic Fluid    Delivery Summary     OB Surgeon:  Uzma Hudson DO  Anesthesia: Spinal  Delivery Type:  LTCS  Perineum: OBPERINEUM: Intact  Feeding method: Both    Infant: male  infant;    Weight: 2730 g (6 lb 0.3 oz)     APGARS: 4  @ 1 minute / 8  @ 5 minutes   Venous Blood Gas: No results found for: \"PHCVEN\", \"BECVEN\"   Arterial Blood Gas:   pH, Cord Arterial   Date Value Ref Range Status   2024 7.26 7.21 - 7.31 pH Units Final     Base Exc, Cord Arterial   Date Value Ref Range Status   2024 -4.7 (L) -2.0 - 2.0 mmol/L Final        Hospital Course     Hospital Course:  Grisel Logan is a 28 y.o.  36w2d who presented on 2024 for NST. During her NST it was noted that it was non reactive.  The patient was sent for a biophysical profile which was 6/8 giving her a total of 6/10.  The patient was therefore observed for 24 hours and started on IV hydration and given steroids for lung maturity.  The patient remained on continuous fetal monitoring and continued to have reassuring fetal tracing going from Cat I to Cat II tracing at times.  The patient had repeat biophysical testing in 24 hours and it was still 6/8.  The patient received her 2nd dose of steroids.  I reviewed the findings with M who recommended delivery.  I discussed with the patient and her " family and we decided to perform primary CS. The CS was uneventful.  The patient remained afebrile and had a normal return to bowel function.  She was tolerating a regular diet on the morning after surgery.  She desired discharge on PPD#2 as her baby had been transferred to [facility].    Day of Discharge     Vital Signs:  Temp:  [97.3 °F (36.3 °C)-98.1 °F (36.7 °C)] 98 °F (36.7 °C)  Heart Rate:  [67-93] 89  Resp:  [16-20] 18  BP: (111-129)/(67-75) 129/67    Pertinent  and/or Most Recent Results     LAB RESULTS:       Lab 02/08/24  0551 02/06/24 1957   WBC 18.41* 7.98   HEMOGLOBIN 9.6* 10.2*   HEMATOCRIT 30.6* 31.4*   PLATELETS 270 262   NEUTROS ABS 16.76*  --    IMMATURE GRANS (ABS) 0.13*  --    LYMPHS ABS 0.95  --    MONOS ABS 0.55  --    EOS ABS 0.00  --    MCV 84.8 83.3                 Lab 02/06/24 1957   ABO TYPING A   RH TYPING Positive   ANTIBODY SCREEN Negative     URINALYSIS@  Microbiology Results (last 10 days)       Procedure Component Value - Date/Time    COVID PRE-OP / PRE-PROCEDURE SCREENING ORDER (NO ISOLATION) - Swab, Nasopharynx [[id number]]  (Normal) Collected: 02/06/24 1945    Lab Status: Final result Specimen: Swab from Nasopharynx Updated: 02/06/24 2033    Narrative:      The following orders were created for panel order COVID PRE-OP / PRE-PROCEDURE SCREENING ORDER (NO ISOLATION) - Swab, Nasopharynx.  Procedure                               Abnormality         Status                     ---------                               -----------         ------                     COVID-19, FLU A/B, RSV P...[[id number]]  Normal              Final result                 Please view results for these tests on the individual orders.    COVID-19, FLU A/B, RSV PCR 1 HR TAT - Swab, Nasopharynx [[id number]]  (Normal) Collected: 02/06/24 1945    Lab Status: Final result Specimen: Swab from Nasopharynx Updated: 02/06/24 2033     COVID19 Not Detected     Influenza A PCR Not Detected     Influenza B PCR Not Detected      RSV, PCR Not Detected    Narrative:      Fact sheet for providers: https://www.fda.gov/media/527872/download    Fact sheet for patients: https://www.fda.gov/media/540375/download    Test performed by PCR.    Group B Strep (Molecular) - Swab, Vaginal/Rectum [[id number]]  (Normal) Collected: 02/06/24 0936    Lab Status: Final result Specimen: Swab from Vaginal/Rectum Updated: 02/07/24 1016     Group B Strep, DNA Negative        US Fetal Biophysical Profile;Without Non-Stress Testing    Result Date: 2/7/2024  Impression:   1. Abnormal biophysical profile score of 6/8 with 0 points given for fetal breathing motion 2. SOFÍA 13.7 cm 3. Fetal heart motion with heart rate of 163 beats per minute     ZAC CRISOSTOMO MD       Electronically Signed and Approved By: ZAC CRISOSTOMO MD on 2/07/2024 at 17:18             US Fetal Biophysical Profile;Without Non-Stress Testing    Result Date: 2/6/2024  Impression:   Biophysical profile score 6 of 8.  I called report to Paras STANTON at 17 30.     LITTLE CARRILLO MD       Electronically Signed and Approved By: LITTLE CARRILLO MD on 2/06/2024 at 17:36                Discharge Details        Discharge Medications        New Medications        Instructions Start Date   acetaminophen 325 MG tablet  Commonly known as: TYLENOL   650 mg, Oral, Every 6 Hours      ibuprofen 600 MG tablet  Commonly known as: ADVIL,MOTRIN   600 mg, Oral, Every 6 Hours      oxyCODONE 5 MG immediate release tablet  Commonly known as: ROXICODONE   5 mg, Oral, Every 6 Hours PRN             Continue These Medications        Instructions Start Date   escitalopram 10 MG tablet  Commonly known as: Lexapro   15 mg, Oral, Every Morning      famotidine 10 MG tablet  Commonly known as: PEPCID       folic acid 1 MG tablet  Commonly known as: FOLVITE   1 mg, Oral, Daily      ZyrTEC Allergy 10 MG tablet  Generic drug: cetirizine              Stop These Medications      aspirin 81 MG EC tablet              No Known  Allergies    Discharge Disposition:   Home, self-care    Discharge Condition:  Good    Diet:   Regular    Discharge Activity:        Follow Up:  Future Appointments   Date Time Provider Department Center   2/13/2024  2:00 PM Aleida You APRN [facility]   2/15/2024 10:20 AM Uzma Hudson DO [facility] OBG [facility]   3/21/2024 10:10 AM Uzma Hudson DO [facility] OB [facility]       Electronically signed by Uzma Hudson DO, 02/09/24, 12:35 PM EST.

## 2024-02-13 ENCOUNTER — TELEMEDICINE (OUTPATIENT)
Dept: BEHAVIORAL HEALTH | Facility: CLINIC | Age: 29
End: 2024-02-13
Payer: COMMERCIAL

## 2024-02-13 DIAGNOSIS — F41.1 GENERALIZED ANXIETY DISORDER: Primary | ICD-10-CM

## 2024-02-13 DIAGNOSIS — F43.29 STRESS AND ADJUSTMENT REACTION: ICD-10-CM

## 2024-02-13 RX ORDER — ESCITALOPRAM OXALATE 20 MG/1
20 TABLET ORAL EVERY MORNING
Qty: 90 TABLET | Refills: 0 | Status: SHIPPED | OUTPATIENT
Start: 2024-02-13 | End: 2024-05-13

## 2024-02-15 ENCOUNTER — OFFICE VISIT (OUTPATIENT)
Dept: OBSTETRICS AND GYNECOLOGY | Facility: CLINIC | Age: 29
End: 2024-02-15
Payer: COMMERCIAL

## 2024-02-15 VITALS
DIASTOLIC BLOOD PRESSURE: 68 MMHG | BODY MASS INDEX: 31.41 KG/M2 | HEART RATE: 80 BPM | SYSTOLIC BLOOD PRESSURE: 104 MMHG | HEIGHT: 64 IN | WEIGHT: 184 LBS

## 2024-02-15 DIAGNOSIS — F41.9 ANXIETY: ICD-10-CM

## 2024-02-15 PROBLEM — Z34.80 SUPERVISION OF OTHER NORMAL PREGNANCY, ANTEPARTUM: Status: RESOLVED | Noted: 2023-07-18 | Resolved: 2024-02-15

## 2024-02-15 RX ORDER — HYDROXYZINE PAMOATE 25 MG/1
25 CAPSULE ORAL EVERY 6 HOURS PRN
Qty: 20 CAPSULE | Refills: 1 | Status: SHIPPED | OUTPATIENT
Start: 2024-02-15

## 2024-02-16 ENCOUNTER — MATERNAL SCREENING (OUTPATIENT)
Dept: CALL CENTER | Facility: HOSPITAL | Age: 29
End: 2024-02-16
Payer: COMMERCIAL

## 2024-03-04 ENCOUNTER — TELEPHONE (OUTPATIENT)
Dept: OBSTETRICS AND GYNECOLOGY | Facility: CLINIC | Age: 29
End: 2024-03-04
Payer: COMMERCIAL

## 2024-03-04 DIAGNOSIS — N61.0 ACUTE MASTITIS: Primary | ICD-10-CM

## 2024-03-04 RX ORDER — AMOXICILLIN AND CLAVULANATE POTASSIUM 875; 125 MG/1; MG/1
1 TABLET, FILM COATED ORAL 2 TIMES DAILY
Qty: 20 TABLET | Refills: 0 | Status: SHIPPED | OUTPATIENT
Start: 2024-03-04 | End: 2024-03-14

## 2024-03-04 NOTE — TELEPHONE ENCOUNTER
"Patient called with complaints of possible mastitis. She has noted body aches with a fever that had got up to 102F but is currently controlled with OTC motrin/Tylenol. She has also noted tenderness in both breasts but the left is worse. She is pumping and states she is an \"over-\". She is requesting medication. Please advise.     (Ok to leave detailed message.)  "

## 2024-03-06 ENCOUNTER — TELEMEDICINE (OUTPATIENT)
Dept: PSYCHIATRY | Facility: CLINIC | Age: 29
End: 2024-03-06
Payer: COMMERCIAL

## 2024-03-06 DIAGNOSIS — Z63.5 DISRUPTION OF FAMILY BY SEPARATION AND DIVORCE: ICD-10-CM

## 2024-03-06 DIAGNOSIS — F41.1 GENERALIZED ANXIETY DISORDER: Primary | ICD-10-CM

## 2024-03-06 DIAGNOSIS — Z63.0 STRESS DUE TO MARITAL PROBLEMS: ICD-10-CM

## 2024-03-06 DIAGNOSIS — F43.29 STRESS AND ADJUSTMENT REACTION: ICD-10-CM

## 2024-03-06 SDOH — SOCIAL STABILITY - SOCIAL INSECURITY: DISRUPTION OF FAMILY BY SEPARATION AND DIVORCE: Z63.5

## 2024-03-06 SDOH — SOCIAL STABILITY - SOCIAL INSECURITY: PROBLEMS IN RELATIONSHIP WITH SPOUSE OR PARTNER: Z63.0

## 2024-03-06 NOTE — PATIENT INSTRUCTIONS
"1. Should you want to get in touch with your provider, KENYA Day, please contact MY Medical Assistant, Kristi, directly at [phone].  Recommend saving Kristi's direct number in phone as this is the PREFERRED & EASIEST way to get in contact with your provider.  Please leave a voice mail if you do not get an answer and she will return your call within 24 hrs. You will NOT be able to contact provider on [employer], as Behavioral Health Providers are restricted. YOU MUST CALL [phone]  If you need to speak with the on call provider after hours or on weekends, please Contact the [facility] ([phone]) and staff will be able to page the provider on call directly.     2.  In the event you need to cancel an appointment, please notify the office at least 24 hrs prior:   Contact **Kristi Medical Assistant at [facility] directly at [phone] or the [facility] ([phone])     3. MEDICATION REFILLS:  PLEASE CALL THE PHARMACY TO REQUEST ALL MEDICATION REFILLS or via [employer] TO ENSURE YOU ARE RECEIVING YOUR MEDICATIONS IN A TIMELY MANNER. The pharmacy or [employer] [facility] will send this request ELECTRONICALLY to the ordering provider.   IF YOU USE AN AUTOMATED SERVICE AT THE PHARMACY FOR REFILLS AND ARE TOLD THERE ARE \"NO REFILLS REMAINING\"   PLEASE CALL THE PHARMACY & SPEAK TO A LIVE PERSON TO VERIFY IT IS THE MOST UP TO DATE PRESCRIPTION ON FILE.    All new prescriptions will have a different number, therefore, if you were given refills for a medication today or at last visit it will not have the same number as the previous prescription.     4.  In the event you have personal crisis, contact the following crisis numbers: Suicide Prevention Hotline 1-848.383.8584 or *988, TOM Helpline 9-111-222-SLAO; [facility] Emergency Room [phone]; text HELLO to 749000; or 911.  If you feel like harming yourself or others, call 911 right away.  You can call the 984 Suicide and Crisis " "Lifeline at  988   to speak with a counselor at the [employer], or you can connect with one using their online chat  .    5.  Never stop an antidepressant medicine without first talking to a healthcare provider. Suddenly stopping this type of medication can cause withdrawal symptoms.    6.  Counseling and talk therapy  Counseling or therapy teaches you new coping skills and more adaptive ways of thinking about problems. These tools can help you make positive changes. The benefits of counseling often last long after treatment sessions have stopped.    7.   We would appreciate your feedback, please scan the QRS code on the back of your appointment card (or see below) and complete a brief survey.  [place] location is still not available, so please click \"[place]\" location.  Thank you      SPECIFIC RECOMMENDATIONS:     1.      Medications discussed at this encounter:                   -  Patient instructed to request refills when appropriate, when down to 5-7 days remaining via  pharmacy or via [employer].       2.      Psychotherapy recommendations: Continue with current therapist.      3.     Return to clinic: 4 weeks Wed 4/3/24 at 1020 am via video    Please arrive at least 15 minutes before your scheduled appointment time to complete check in process.      IF you are scheduled for a [employer] VIDEO visit, YOU MUST COMPLETE THE \"E-CHECK IN\" PROCESS PRIOR TO BEGINNING THE VISIT, YOU WILL NO LONGER RECEIVE A PHONE CALL PRIOR TO ALL VIDEO VISITS; You may still complete the E-Check in for in office visits prior to appointment, you will receive multiple text/email reminders which will direct you further if needed.           "

## 2024-03-06 NOTE — PROGRESS NOTES
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    RBC 02/06/2024 3.77  3.77 - 5.28 10*6/mm3 Final    Hemoglobin 02/06/2024 10.2 (L)  12.0 - 15.9 g/dL Final    Hematocrit 02/06/2024 31.4 (L)  34.0 - 46.6 % Final    MCV 02/06/2024 83.3  79.0 - 97.0 fL Final    MCH 02/06/2024 27.1  26.6 - 33.0 pg Final    MCHC 02/06/2024 32.5  31.5 - 35.7 g/dL Final    RDW 02/06/2024 12.8  12.3 - 15.4 % Final    RDW-SD 02/06/2024 38.7  37.0 - 54.0 fl Final    MPV 02/06/2024 10.0  6.0 - 12.0 fL Final    Platelets 02/06/2024 262  140 - 450 10*3/mm3 Final    Treponemal AB Total 02/06/2024 Non-Reactive  Non-Reactive Final    Amphet/Methamphet, Screen 02/06/2024 Negative  Negative Final    Barbiturates Screen, Urine 02/06/2024 Negative  Negative Final    Benzodiazepine Screen, Urine 02/06/2024 Negative  Negative Final    Cocaine Screen, Urine 02/06/2024 Negative  Negative Final    Opiate Screen 02/06/2024 Negative  Negative Final    THC, Screen, Urine 02/06/2024 Negative  Negative Final    Methadone Screen, Urine 02/06/2024 Negative  Negative Final    Oxycodone Screen, Urine 02/06/2024  Negative  Negative Final    Fentanyl, Urine 02/06/2024 Negative  Negative Final    COVID19 02/06/2024 Not Detected  Not Detected - Ref. Range Final    Influenza A PCR 02/06/2024 Not Detected  Not Detected Final    Influenza B PCR 02/06/2024 Not Detected  Not Detected Final    RSV, PCR 02/06/2024 Not Detected  Not Detected Final    pH, Cord Arterial 02/07/2024 7.26  7.21 - 7.31 pH Units Final    pCO2, Cord Arterial 02/07/2024 52.7 (H)  33.0 - 49.0 mmHg Final    Base Exc, Cord Arterial 02/07/2024 -4.7 (L)  -2.0 - 2.0 mmol/L Final    pO2, Cord Arterial 02/07/2024 <40.5  mmHg Final    HCO3, Cord Arterial 02/07/2024 23.0  mmol/L Final    Case Report 02/07/2024    Final                    Value:Surgical Pathology Report                         Case: [id number]                                  Authorizing Provider:  Uzma Hudson DO      Collected:           02/07/2024 09:33 PM          Ordering Location:     [facility]      Received:            02/09/2024 09:13 AM                                 LABOR AND DELIVERY                                                           Pathologist:           Eriberto Ward MD                                                            Specimen:    Placenta                                                                                   Clinical Information 02/07/2024    Final                    Value:This result contains rich text formatting which cannot be displayed here.    Final Diagnosis 02/07/2024    Final                    Value:This result contains rich text formatting which cannot be displayed here.    Gross Description 02/07/2024    Final                    Value:This result contains rich text formatting which cannot be displayed here.    Microscopic Description 02/07/2024    Final                    Value:This result contains rich text formatting which cannot be displayed here.    WBC 02/08/2024 18.41 (H)  3.40 - 10.80 10*3/mm3 Final    RBC 02/08/2024 3.61 (L)   3.77 - 5.28 10*6/mm3 Final    Hemoglobin 02/08/2024 9.6 (L)  12.0 - 15.9 g/dL Final    Hematocrit 02/08/2024 30.6 (L)  34.0 - 46.6 % Final    MCV 02/08/2024 84.8  79.0 - 97.0 fL Final    MCH 02/08/2024 26.6  26.6 - 33.0 pg Final    MCHC 02/08/2024 31.4 (L)  31.5 - 35.7 g/dL Final    RDW 02/08/2024 12.7  12.3 - 15.4 % Final    RDW-SD 02/08/2024 38.8  37.0 - 54.0 fl Final    MPV 02/08/2024 10.5  6.0 - 12.0 fL Final    Platelets 02/08/2024 270  140 - 450 10*3/mm3 Final    Neutrophil % 02/08/2024 91.0 (H)  42.7 - 76.0 % Final    Lymphocyte % 02/08/2024 5.2 (L)  19.6 - 45.3 % Final    Monocyte % 02/08/2024 3.0 (L)  5.0 - 12.0 % Final    Eosinophil % 02/08/2024 0.0 (L)  0.3 - 6.2 % Final    Basophil % 02/08/2024 0.1  0.0 - 1.5 % Final    Immature Grans % 02/08/2024 0.7 (H)  0.0 - 0.5 % Final    Neutrophils, Absolute 02/08/2024 16.76 (H)  1.70 - 7.00 10*3/mm3 Final    Lymphocytes, Absolute 02/08/2024 0.95  0.70 - 3.10 10*3/mm3 Final    Monocytes, Absolute 02/08/2024 0.55  0.10 - 0.90 10*3/mm3 Final    Eosinophils, Absolute 02/08/2024 0.00  0.00 - 0.40 10*3/mm3 Final    Basophils, Absolute 02/08/2024 0.02  0.00 - 0.20 10*3/mm3 Final    Immature Grans, Absolute 02/08/2024 0.13 (H)  0.00 - 0.05 10*3/mm3 Final    nRBC 02/08/2024 0.0  0.0 - 0.2 /100 WBC Final   Routine Prenatal on 02/06/2024   Component Date Value Ref Range Status    Glucose, UA 02/06/2024 Negative  Negative mg/dL Final    Protein, POC 02/06/2024 Negative  Negative mg/dL Final    Group B Strep, DNA 02/06/2024 Negative  Negative Final   Routine Prenatal on 01/23/2024   Component Date Value Ref Range Status    Glucose, UA 01/23/2024 Negative  Negative mg/dL Final    Protein, POC 01/23/2024 Negative  Negative mg/dL Final   Routine Prenatal on 01/09/2024   Component Date Value Ref Range Status    Glucose, UA 01/09/2024 Negative  Negative mg/dL Final    Protein, POC 01/09/2024 Negative  Negative mg/dL Final   Routine Prenatal on 12/26/2023   Component Date Value  Ref Range Status    Glucose, UA 12/26/2023 Negative  Negative mg/dL Final    Protein, POC 12/26/2023 Negative  Negative mg/dL Final   Routine Prenatal on 12/12/2023   Component Date Value Ref Range Status    Glucose, UA 12/12/2023 Negative  Negative mg/dL Final    Protein, POC 12/12/2023 Trace (A)  Negative mg/dL Final   Routine Prenatal on 11/14/2023   Component Date Value Ref Range Status    Glucose, UA 11/14/2023 Negative  Negative mg/dL Final    Protein, POC 11/14/2023 Negative  Negative mg/dL Final    WBC 11/14/2023 8.11  3.40 - 10.80 10*3/mm3 Final    RBC 11/14/2023 4.11  3.77 - 5.28 10*6/mm3 Final    Hemoglobin 11/14/2023 12.2  12.0 - 15.9 g/dL Final    Hematocrit 11/14/2023 36.2  34.0 - 46.6 % Final    MCV 11/14/2023 88.1  79.0 - 97.0 fL Final    MCH 11/14/2023 29.7  26.6 - 33.0 pg Final    MCHC 11/14/2023 33.7  31.5 - 35.7 g/dL Final    RDW 11/14/2023 12.5  12.3 - 15.4 % Final    RDW-SD 11/14/2023 39.7  37.0 - 54.0 fl Final    MPV 11/14/2023 9.3  6.0 - 12.0 fL Final    Platelets 11/14/2023 313  140 - 450 10*3/mm3 Final    Glucose Gestational Screen 11/14/2023 81  65 - 139 mg/dL Final   Routine Prenatal on 09/19/2023   Component Date Value Ref Range Status    Glucose, UA 09/19/2023 Negative  Negative mg/dL Final    Protein, POC 09/19/2023 Negative  Negative mg/dL Final       EKG Results:  No orders to display       Imaging Results:  No Images in the past 120 days found..      Assessment & Plan   Diagnoses and all orders for this visit:    1. Generalized anxiety disorder (Primary)    2. Post partum depression    3. Stress and adjustment reaction    4. Stress due to marital problems    5. Disruption of family by separation and divorce                Visit Diagnoses:    ICD-10-CM ICD-9-CM   1. Generalized anxiety disorder  F41.1 300.02   2. Post partum depression  F53.0 648.44     311   3. Stress and adjustment reaction  F43.29 309.89   4. Stress due to marital problems  Z63.0 V61.10   5. Disruption of family  by separation and divorce  Z63.5 V61.03               PLAN:  Safety: No acute safety concerns  Therapy: Currently Seeing a Therapist Fela Pride with [facility]  Risk Assessment: Risk of self-harm acutely is moderate.  Risk factors include anxiety disorder and mood disorder, current pregnancy with marital problems.  Protective factors include no family history, denies access to guns/weapons, no present SI, no history of suicide attempts or self-harm in the past, minimal AODA, healthcare seeking, future orientation, willingness to engage in care.  Risk of self-harm chronically is also moderate, but could be further elevated in the event of treatment noncompliance and/or AODA.  Meds:  Continue Escitalopram (LEXAPRO) 20 mg (currently taking 2 of the 10 mg and will  the 20 mg tabs previously ordered) by mouth daily in the morning to target depression and anxiety.  Risks, benefits, alternatives discussed with patient including GI upset, nausea, vomiting, diarrhea, theoretical decrease of seizure threshold predisposing the patient to a slightly higher seizure risk, headaches, sexual dysfunction, serotonin syndrome, sweating, and bleeding risk. After discussion of these risks and benefits, the patient voiced understanding and agreed to proceed.  Patient is breastfeeding via pumping, patient is aware of medication present in the breastmilk at a lower concentration.  5.  Labs: n/a  6.  Patient instructed to request refills when appropriate, when down to 5-7 days remaining via  pharmacy or via MyChart.     7.  Will send update to  due to post partum status.     Symptoms of anxiety, depression are under good control with current medication regimen.  Due to recent stressors of  son whom remains in the NICU, spouse filing for divorce, separation of family, home medical care needs of , and finances, patient has some anxiety, though has a good support system. Lengthy discussion about care  needs, expectations, and assistance with upcoming divorce proceedings mediation regarding children, which patient was appreciative.  Patient will be working [employer] due to medical needs of , and applying for medicaid as patient will no longer be covered on spouse insurance once , which is not expected to complete for at least 60 days.   Patient was given instructions and counseling regarding condition and for health maintenance advice. Please see specific information pulled into the AVS if appropriate.    Patient to contact provider if symptoms worsen or fail to improve.      24:   -Continue Escitalopram (LEXAPRO) 20 mg (patient self increased from 15 mg to 20 mg after 1 week) by mouth daily in the morning to target depression and anxiety.  Risks, benefits, alternatives discussed with patient including GI upset, nausea, vomiting, diarrhea, theoretical decrease of seizure threshold predisposing the patient to a slightly higher seizure risk, headaches, sexual dysfunction, serotonin syndrome, sweating, and bleeding risk. After discussion of these risks and benefits, the patient voiced understanding and agreed to proceed. New prescription for 20 mg sent to pharmacy with a note to fill when appropriate, patient instructed to continue taking 2 of the 10 mg on hand to equal 20 mg. Patient is breastfeeding via pumping, patient is aware of medication present in the breastmilk at a lower concentration     Symptoms of anxiety and depression are under good control with current medication regimen.   Due to recent early delivery of baby boy, patient has experienced higher levels of stress and anxiety.  Patient will be seen again in 2 weeks as patient is at high risk of post partum depression worsening due to past history and current complications with .   Patient was given instructions and counseling regarding condition and for health maintenance advice. Please see specific information pulled into the AVS  if appropriate.    Patient to contact provider if symptoms worsen or fail to improve.      1/16/24:   INCREASE Escitalopram (LEXAPRO) FROM 10 mg TO 15 mg (instructed to take 1.5 tabs of the 10 mg) by mouth daily in the morning to target depression and anxiety.  Risks, benefits, alternatives discussed with patient including GI upset, nausea, vomiting, diarrhea, theoretical decrease of seizure threshold predisposing the patient to a slightly higher seizure risk, headaches, sexual dysfunction, serotonin syndrome, sweating, and bleeding risk. After discussion of these risks and benefits, the patient voiced understanding and agreed to proceed. 90 day supply sent to pharmacy.   -Updated Dr. Bowman on current POC as patient is pregnant.   -Patient given counseling list and alternative suggestions for spouse individual therapy as a resource.     Symptoms of anxiety, depression are under fair control with current medication regimen.  Due to BLAIRE of 3/4/24, a 90 day supply of medication was sent, and will have patient return in 4 weeks, patient expressed interest in wanting to further increase to 20 mg if able to tolerate 15 mg due to past post partum depression and current stress with .  Will consider at follow up appointment.   Patient was given instructions and counseling regarding condition and for health maintenance advice. Please see specific information pulled into the AVS if appropriate.    Patient to contact provider if symptoms worsen or fail to improve.      12/27/23:   Therapy: Currently Seeing a Therapist Fela Pride with [facility]  -Continue Escitalopram (LEXAPRO) 10 mg by mouth daily in the morning, as nightly dose caused insomnia, to target depression and anxiety.    New prescription sent for a 7 day supply to fill 1/5/24, as patient reports having 2 weeks remaining, as patient did not start until 12/12/23.  Patient to contact provider if insurance will not cover 7 day supply, will then send for  30 days, expect to increase dose therefore only 7 day was ordered.     -Updated Dr. Bowman on current POC as patient is pregnant.     Symptoms of anxiety, depression are under fair to good control with current medication regimen.  Due to duration of therapy with Lexapro for 2 weeks will plan on re-evaluating in 3 weeks and then determine if dose increase will be appropriate.  Patient is in agreement with plan.   Patient was given instructions and counseling regarding condition and for health maintenance advice. Please see specific information pulled into the AVS if appropriate.    Patient to contact provider if symptoms worsen or fail to improve.       12/7/23:  TELEPHONE  Called patient to discuss medication options after discussion with  yesterday, however, patient was driving on a road with poor service, patient has lunch from 1-2 pm today, and will call patient back around 115 pm today to discuss.      Called patient back and informed patient after speaking with , if the Zoloft is not working as effectively it is reasonable to switch to Lexapro as discussed previously or could also split the dose of Zoloft.     Patient instructed to Switch from Zoloft 100 mg by mouth nightly TO Escitalopram (LEXAPRO) 10 mg by mouth nightly to target depression and anxiety.  Risks, benefits, alternatives discussed with patient including GI upset, nausea, vomiting, diarrhea, theoretical decrease of seizure threshold predisposing the patient to a slightly higher seizure risk, headaches, sexual dysfunction, serotonin syndrome, sweating, and bleeding risk.    Risks, benefits, and potential side effects of SSRI's in pregnancy, including low risk of septal heart defects, drug discontinued syndrome of neonates-respiratory distress, cyanosis, seizure, respiratory distress, feeding difficulty, vomiting, hypoglycemia, tremors, irritability, jitteriness, and constant crying- these problems were discussed in detail with the  patient.  After discussion of these risks, as well as risk of untreated psychiatric illness, patient and examiner have mutually agreed to go forward with use of this medication during pregnancy.     Patient instructed to proceed with taking Zoloft tonight if Lexapro is unavailable from the pharmacy and to not take both medications on the same day.   Will schedule patient for an earlier visit on Wed 12/27/23 at 1140 via video, patient has lunch at 1230, though feels she will be able to adjust schedule and informed patient if unable to do so at scheduled time would wait for her to arrive by 1215 pm. Patient verbalized understanding. Patient instructed to contact provider directly if any thoughts of suicide occurred, negative intrusive thoughts, or intolerance to notify provider immediately.     Will update  of changes to POC.    12/6/23:  Safety: No acute safety concerns  Therapy: Currently Seeing a Therapist Fela Pride with [facility]  Patient educated and encouraged to continue therapy to develop new coping skills and more adaptive ways of thinking about problems. These tools can help make positive changes. The benefits of counseling often last long after treatment sessions have stopped.  Continue Zoloft 100 mg by mouth daily in the morning to target depression and anxiety.  Discussed all risks, benefits, alternatives, and side effects of Zoloft (Sertraline) including but not limited to GI upset, sexual dysfunction, bleeding risk, theoretical decrease of seizure threshold predisposing the patient to a slightly higher seizure risk, insomnia, sedation, dizziness, fatigue, drowsiness, activation of dari or hypomania, increased fragility fracture risk, hyponatremia, ocular effects, withdrawal syndrome following abrupt discontinuation, serotonin syndrome, and increased suicidality in patients 24 years and younger.  Patient educated on the need to practice safe sex while taking this medication.  Discussed the need for patient to immediately call the office for any new or worsening symptoms, such as worsening depression; feeling nervous or restless; suicidal thoughts or actions; or other changes in mood or behavior, and all other concerns. Patient  educated on medication compliance and the risks of suddenly stopping this medication or missing doses. Patient verbalized understanding and is agreeable to taking Sertraline. Addressed all questions and concerns. Patient informed Zoloft is safe in breastfeeding as it is present in the breastmilk at a concentration far less than 10% (the threshold).     Patient informed that going forward refills of current psychotropic medications previously prescribed by PCP will now be managed by this provider, and to contact provider MA INITIALLY when down to 5-7 days remaining to ensure new refill(s) will have this provider name on prescription for future refills. Explained to patient if requested from current bottle, via mychart, or via pharmacy the request would be directed to ordering provider. And to prevent confusion, inaccurate dosing, inaccurate medication refills, the management of psychotropic and/or mental health medications should only be ordered by this mental health provider. Patient verbalized understanding and agreed to proceed.    -Coping mechanisms discussed with patient today as a way to gain control over feelings to prevent situation or panic attack from getting worse: grounding techniques using 5 senses (name 3 things you can see, smell, touch, etc.), slow paced breathing techniques- focus on door inhaling and exhaling at each corner, application of cold compress/ice pack/water on face or opening freezer door to allow cold air to be breathed in taking slow deep breaths, closing eyes and focus on positive thoughts.   -Will discuss POC with  and contact patient later this evening regarding potentially increasing Zoloft to 125 mg daily due to  intolerance with 150 mg in the past, vs splitting doses as patient inquired. Patient denies need for refill today.     Patient presentation seems most consistent with anxiety and  depression due to marital stress.   Patient is at a higher risk of post partum depression due to current marital stressors and history of post partum depression with first child, which was managed well with Zoloft.  Switching to an alternative antidepressant during 3rd trimester of pregnancy would not be advised, however, will discuss further with Dr. Bowman. Patient is agreeable to plan.  Patient was given direct contact number to MA in [facility] for any needs after 430 pm as an on call provider is available.   Patient was given instructions and counseling regarding condition and for health maintenance advice. Please see specific information pulled into the AVS if appropriate.   Patient to contact provider if symptoms worsen or fail to improve.      Patient screened positive for depression based on a PHQ-9 score of 8 on 3/6/2024. Follow-up recommendations include: Prescribed antidepressant medication treatment and Suicide Risk Assessment performed.     TREATMENT PLAN/GOALS: Continue supportive psychotherapy efforts and medications as indicated. Treatment and medication options discussed during today's visit. Patient ackowledged and verbally consented to continue with current treatment plan and was educated on the importance of compliance with treatment and follow-up appointments.    MEDICATION ISSUES:  NATALIE reviewed as expected.  Discussed medication options and treatment plan of prescribed medication as well as the risks, benefits, and side effects including potential falls, possible impaired driving and metabolic adversities among others. Patient is agreeable to call the office with any worsening of symptoms or onset of side effects. Patient is agreeable to call 911 or go to the nearest ER should  he/she begin having SI/HI. No medication side effects or related complaints today.     MEDS ORDERED DURING VISIT:  No orders of the defined types were placed in this encounter.      Return in about 4 weeks (around 4/3/2024) for Video visit, medication check.         I spent 58 minutes caring for Grisel on this date of service. This time includes time spent by me in the following activities: preparing for the visit, obtaining and/or reviewing a separately obtained history, performing a medically appropriate examination and/or evaluation, counseling and educating the patient/family/caregiver, referring and communicating with other health care professionals, documenting information in the medical record, care coordination, and scheduling .        This document has been electronically signed by KENYA Day  March 6, 2024 12:51 EST           Part of this note may be an electronic transcription/translation of spoken language to printed text using the Dragon Dictation System.

## 2024-03-11 ENCOUNTER — TELEPHONE (OUTPATIENT)
Dept: OBSTETRICS AND GYNECOLOGY | Facility: CLINIC | Age: 29
End: 2024-03-11
Payer: COMMERCIAL

## 2024-03-11 NOTE — TELEPHONE ENCOUNTER
Left a message per her request that what she is feeling is a knot of suture material under the skin. She was advised to monitor and call if worsening concerns such as redness, increasing pain, drainage, or if she has further questions/concerns.

## 2024-03-11 NOTE — TELEPHONE ENCOUNTER
Caller: Grisel Logan    Relationship to patient: Self    Best call back number: [phone]    Chief complaint: PATIENT IS SCHEDULED FOR POSTOP ON 03/15/24 FROM CSECTION BIRTH BUT WANTS TO COME IN TODAY OR TOMORROW. SHE FOUND A KNOT UNDER CSECTION SCAR AND IS WORRIED.     OK TO LVM

## 2024-03-14 NOTE — PROGRESS NOTES
"POSTPARTUM Follow Up Visit      Chief Complaint   Patient presents with    Postpartum Care     HPI:    Date of delivery: 24   Delivery type:   LTCS          Perineum : Intact  Delivering Provider:   Dr. Uzma Hudson      Feeding: Pumping  Pain:  yes, Right side tenderness, knot  Vaginal Bleeding:  yes, spotting   Plans for BC:  Desires to discuss options      Postpartum inventory   Infant feeding: Pumping  Postpartum pain: None  Vaginal bleeding : Mostly light - only using liner  Depression/Anxiety: Minimal depression; Minimal anxiety; Moderate anxiety  Contracepton: IUD  COMMENTS: Concern with knot/lump at incision site and tenderness    EPD Scale: Thought of Harming Self: 0-->never  Deposit  Depression Score: 10        Depressed/Anxious:  yes, anxious   EPDS score: 10  #10: 0  Weight at last OB OV:  198 Lb  Last pap date and result: 23 BRENNAN     Has rx for lexapro and visteril     PHYSICAL EXAM:  /74   Pulse 77   Ht 162.6 cm (64\")   Wt 83 kg (183 lb)   Breastfeeding Yes Comment: pumping  BMI 31.41 kg/m²  Not found. Chaperone present  General- NAD, alert and oriented, appropriate  Psych- Normal mood, good memory, good eye contact    INCISION : Clean, dry, intact, no evidence of infection  Abdomen- Soft, non distended, non tender, no masses     Chaperone present during pelvic exam.  External genitalia- Normal.    Urethra/Bladder/Vagina- Normal, no masses, non-tender  Prolapse : none noted, not examined with split speculum to delineate     Cervix- Normal, no lesions, no discharge, no CMT  Uterus- Normal size, shape & consistency.  Non tender, mobile.  Adnexa- Normal, no mass, non-tender    Lymphatic- No palpable groin nodes  Extremities- No edema    Discharge Summary by Uzma Hudson DO (2024 12:35)    ASSESSMENT AND PLAN:  Diagnoses and all orders for this visit:    1. Postpartum care following  delivery (Primary)      Counseling:    All birth control " options reviewed in detail.  R/B/A/SE/E of each wrt pts PMHx and prior BC use  May resume intercourse  May resume normal activities  Core strengthening exercises reviewed and recommended  After a CS, I recommend at least TWO years before delivery of her next child to allow complete healing of hysterotomy  Considering paragard iud        Follow Up:  Return if symptoms worsen or fail to improve.          Uzma Hudson DO  03/15/2024    [facility] [facility]  [facility] [place] [address]  Dept: [phone]  Dept Fax: [phone]  Loc: [phone]  Loc Fax: [phone]

## 2024-03-15 ENCOUNTER — POSTPARTUM VISIT (OUTPATIENT)
Dept: OBSTETRICS AND GYNECOLOGY | Facility: CLINIC | Age: 29
End: 2024-03-15
Payer: COMMERCIAL

## 2024-03-15 VITALS
HEIGHT: 64 IN | SYSTOLIC BLOOD PRESSURE: 116 MMHG | DIASTOLIC BLOOD PRESSURE: 74 MMHG | BODY MASS INDEX: 31.24 KG/M2 | HEART RATE: 77 BPM | WEIGHT: 183 LBS

## 2024-03-15 PROCEDURE — 0503F POSTPARTUM CARE VISIT: CPT | Performed by: OBSTETRICS & GYNECOLOGY

## 2024-03-26 ENCOUNTER — PATIENT MESSAGE (OUTPATIENT)
Dept: OBSTETRICS AND GYNECOLOGY | Facility: CLINIC | Age: 29
End: 2024-03-26
Payer: COMMERCIAL

## 2024-03-27 NOTE — TELEPHONE ENCOUNTER
The patient called back.  The incision site is not oozing anymore but she is still concerned about the redness and tenderness there.  I told her to be sure to keep the area clean and dry.  Advised to use antibacterial ointment and monitor for signs of infection.  She requested an appointment to have it evaluated, so I have set her up for 2 pm tomorrow.

## 2024-04-01 ENCOUNTER — TELEPHONE (OUTPATIENT)
Dept: OBSTETRICS AND GYNECOLOGY | Facility: CLINIC | Age: 29
End: 2024-04-01
Payer: COMMERCIAL

## 2024-06-03 NOTE — PROGRESS NOTES
GYN Problem/Follow Up Visit    Chief Complaint   Patient presents with    Follow-up     Possible uterine prolapse            HPI  Grisel Logan is a 28 y.o. female, , who presents for feel protrusion of vaginal tissue at vaginal wall and can see with mirror since last childbirth, difficulty inserting tampons, otherwise no pain, not sexually active, 4 months post partum, pumping      Normal vaginal discharge     Normal bowel and bladder function    Additional OB/GYN History   Patient's last menstrual period was 2024 (exact date).  Current contraception: contraceptive methods: Abstinence    Past Medical History:   Diagnosis Date    Anxiety     Depression     Incomplete uterovaginal prolapse 2022    Missed  2023    Added automatically from request for surgery [id number]    Scoliosis 2014    Viral warts 2021    Formatting of this note might be different from the original.  SAL ACID PLASTER      Past Surgical History:   Procedure Laterality Date     SECTION N/A 2024    Procedure:  SECTION PRIMARY;  Surgeon: Uzma Hudson DO;  Location: [facility] LABOR DELIVERY;  Service: Gynecology;  Laterality: N/A;    CYST REMOVAL  1997    bronchial cleft cyst    D & C WITH SUCTION N/A 2023    Procedure: DILATATION AND CURETTAGE WITH SUCTION;  Surgeon: Nidia Pollard DO;  Location: [facility] OR [facility] [place];  Service: Gynecology;  Laterality: N/A;    WISDOM TOOTH EXTRACTION        Family History   Problem Relation Age of Onset    Depression Mother     Anxiety disorder Mother     Hypertension Mother     Alcohol abuse Father     Hypertension Father     Lung cancer Maternal Aunt     Breast cancer Maternal Aunt     Diabetes Maternal Grandfather     Hypertension Maternal Grandfather     Stomach cancer Maternal Grandfather     Lung cancer Maternal Grandfather     Cancer Maternal Grandmother         Pt unsure    Clotting disorder Maternal Grandmother     Alcohol abuse Paternal Grandfather   "   Prostate cancer Paternal Grandfather     Throat cancer Paternal Grandfather     Alcohol abuse Paternal Grandmother     Colon cancer Paternal Grandmother     Hypertension Paternal Grandmother     Stroke Paternal Grandmother     Clotting disorder Paternal Grandmother     Diabetes Maternal Great-Grandfather     Ovarian cancer Neg Hx     Uterine cancer Neg Hx      Allergies as of 06/06/2024    (No Known Allergies)      The additional following portions of the patient's history were reviewed and updated as appropriate: allergies, current medications, past family history, past medical history, past social history, past surgical history, and problem list.    Review of Systems    See HPI for pertinent ROS    Objective   /67   Pulse 85   Ht 162.6 cm (64\")   Wt 77.1 kg (170 lb)   LMP 05/18/2024 (Exact Date)   Breastfeeding Yes Comment: pumping only  BMI 29.18 kg/m²     Physical Exam  Vitals and nursing note reviewed. Exam conducted with a chaperone present.   Constitutional:       Appearance: Normal appearance.   Cardiovascular:      Rate and Rhythm: Normal rate.   Pulmonary:      Effort: Pulmonary effort is normal.   Genitourinary:     General: Normal vulva.      Vagina: Normal. No vaginal discharge, erythema, tenderness, bleeding or lesions.      Cervix: No discharge, friability, lesion, erythema or cervical bleeding (cervix visible at introitus with valsalva).      Uterus: Not enlarged, not fixed and not tender.       Adnexa: Right adnexa normal and left adnexa normal.   Lymphadenopathy:      Lower Body: No right inguinal adenopathy. No left inguinal adenopathy.   Skin:     General: Skin is warm and dry.   Neurological:      Mental Status: She is alert and oriented to person, place, and time.           Diagnoses and all orders for this visit:    1. Uterovaginal prolapse (Primary)  -     Ambulatory Referral to Physical Therapy for Evaluation & Treatment      Counseling:  Uterine prolapse, she reports normal " bowel and bladder function, she is unsure whether desires for future conception therefore not interested in surgical referral. We discuss use of pessary, referral to pelvic floor rehab. She denies pain or abnormal vaginal bleeding or discharge. Desires referral to pelvic floor rehab      Follow Up:  Return if symptoms worsen or fail to improve.        Debbie Rosado, APRN  06/06/2024

## 2024-06-06 ENCOUNTER — OFFICE VISIT (OUTPATIENT)
Dept: OBSTETRICS AND GYNECOLOGY | Facility: CLINIC | Age: 29
End: 2024-06-06
Payer: COMMERCIAL

## 2024-06-06 VITALS
HEIGHT: 64 IN | BODY MASS INDEX: 29.02 KG/M2 | SYSTOLIC BLOOD PRESSURE: 116 MMHG | HEART RATE: 85 BPM | DIASTOLIC BLOOD PRESSURE: 67 MMHG | WEIGHT: 170 LBS

## 2024-06-06 DIAGNOSIS — N81.4 UTEROVAGINAL PROLAPSE: Primary | ICD-10-CM

## 2025-01-15 PROBLEM — Z82.79 FAMILY HISTORY OF CONGENITAL HEART DEFECT: Status: RESOLVED | Noted: 2018-10-25 | Resolved: 2025-01-15

## 2025-01-15 PROBLEM — O36.8390 ABNORMAL FETAL HEART RATE OR RHYTHM AFFECTING MANAGEMENT OF MOTHER: Status: RESOLVED | Noted: 2024-02-07 | Resolved: 2025-01-15

## 2025-01-15 PROBLEM — O36.8390 NON-REASSURING ELECTRONIC FETAL MONITORING TRACING: Status: RESOLVED | Noted: 2024-02-09 | Resolved: 2025-01-15

## 2025-01-15 PROBLEM — J45.909 ASTHMA: Status: RESOLVED | Noted: 2021-06-23 | Resolved: 2025-01-15

## 2025-01-15 NOTE — PROGRESS NOTES
"GYN Visit    Chief Complaint   Patient presents with    Vaginal Prolapse     Discuss surgery for prolapse          HPI:   29 y.o. LMP: Patient's last menstrual period was 2024 (approximate).     Social History     Substance and Sexual Activity   Sexual Activity Not Currently    Partners: Male    Birth control/protection: None   Progress Notes by Debbie Rosado APRN (2024 09:30)    Shoveling snow, felt vag discomfort and then urination had some dysuria.  No more discomfort.  Was sent to PFPT by KENYA Rosado and did not go.     No urinary leakage.  No stool incontinence.  Currently no pain.     Child w HIE, CF, seizures, she needs to lift and care for him he is now 23lbs.  Recent divorce, she is his primary caregiver.  Trying to plan ahead, would like to consider surgery.  Is unsure regarding future childbearing, would like to leave that option open.      History: PMHx, Meds, Allergies, PSHx, Social Hx, and POBHx all reviewed and updated.    PHYSICAL EXAM:  /75   Pulse 87   Ht 162.6 cm (64\")   Wt 78.5 kg (173 lb)   LMP 2024 (Approximate)   BMI 29.70 kg/m²   Facility age limit for growth %celso is 20 years.   Chaperone present during breast and/or pelvic exam if performed.  General- NAD, alert and oriented, appropriate  Psych- Normal mood, good memory    See pessary fitting      ASSESSMENT AND PLAN:  Diagnoses and all orders for this visit:    1. female genital prolapse (Primary)  Overview:  Uterine prolapse to introitus, paravaginal defects, mild cystocele    Pessary ring without support #4  PFPT, plan to re-consider, prior consult, pt did not go  Urogyn consult for discussion surgery R/B 2025     Assessment & Plan:  Today we discussed typically we would like to use conservative options before surgery.  I definitely would not recommend surgery unless she is completely done with childbearing.  We reviewed pelvic floor physical therapy, pessary, and urogynecology consult.  " "She did well with pessary fitting today and would like to give that a try.  She would like to have the initial consultation with urogynecology just to get some more information and is open to considering pelvic floor physical therapy.  Due to time constraints would like to try pessary first and go onto for pelvic floor physical therapy as needed afterwards.    Orders:  -     Ambulatory Referral to Gynecologic Urology  -     Pessary            Follow Up:  Return in about 4 weeks (around 2/14/2025) for Pessary FU.          Nidia Pollard,   01/17/2025    [facility] [place] [facility]  [address] [place] [address]  Dept: [phone]  Dept Fax: [phone]  Loc: [phone]  Loc Fax: [phone]     Pessary Visit        Chief Complaint   Patient presents with    Vaginal Prolapse     Discuss surgery for prolapse          HPI:  See above    PHYSICAL EXAM:  /75   Pulse 87   Ht 162.6 cm (64\")   Wt 78.5 kg (173 lb)   LMP 12/31/2024 (Approximate)   BMI 29.70 kg/m²   External genitalia- Normal, no lesions    Split speculum exam performed  Vagina- Normal, no discharge, no bleeding.  Paravaginal defect bilaterally equal.  Bladder- Normal, no masses, non tender, mild cystocele  Cervix- Normal, no lesions, no discharge, No cervical motion tenderness, to introitus    No rectocele.    Pessary- Pessary type/s (ring without knob) and size/s (3, 4, 5) tried.  Patient tolerated well.     Chaperone present during pelvic exam.    ASSESSMENT AND PLAN:   Diagnoses and all orders for this visit:    1. female genital prolapse (Primary)  Overview:  Uterine prolapse to introitus, paravaginal defects, mild cystocele    Pessary ring without support #4  PFPT, plan to re-consider, prior consult, pt did not go  Urogyn consult for discussion surgery R/B 1/17/2025     Assessment & Plan:  Today we discussed typically we would like to use conservative options before surgery.  I definitely would not " recommend surgery unless she is completely done with childbearing.  We reviewed pelvic floor physical therapy, pessary, and urogynecology consult.  She did well with pessary fitting today and would like to give that a try.  She would like to have the initial consultation with urogynecology just to get some more information and is open to considering pelvic floor physical therapy.  Due to time constraints would like to try pessary first and go onto for pelvic floor physical therapy as needed afterwards.    Orders:  -     Ambulatory Referral to Gynecologic Urology  -     Pessary        Counseling:    Discussed importance of continued kegel exercises  Do not need to remove for intercourse  Pessary ordered, patient to contact our office no later than mid next week if she has not heard from the office.  Instructions on how to place, remove and clean.  Recommend remove nightly clean and air dry.        Follow Up:  Return in about 4 weeks (around 2/14/2025) for Pessary FU.          Nidia Pollard,   01/17/2025    [facility] [place] [facility]  [address] [place] [address]  Dept: [phone]  Dept Fax: [phone]  Loc: [phone]  Loc Fax: [phone]

## 2025-01-17 ENCOUNTER — OFFICE VISIT (OUTPATIENT)
Dept: OBSTETRICS AND GYNECOLOGY | Facility: CLINIC | Age: 30
End: 2025-01-17
Payer: COMMERCIAL

## 2025-01-17 VITALS
HEART RATE: 87 BPM | BODY MASS INDEX: 29.53 KG/M2 | SYSTOLIC BLOOD PRESSURE: 125 MMHG | DIASTOLIC BLOOD PRESSURE: 75 MMHG | HEIGHT: 64 IN | WEIGHT: 173 LBS

## 2025-01-17 DIAGNOSIS — N81.89 OTHER FEMALE GENITAL PROLAPSE: Primary | ICD-10-CM

## 2025-01-17 NOTE — ASSESSMENT & PLAN NOTE
Today we discussed typically we would like to use conservative options before surgery.  I definitely would not recommend surgery unless she is completely done with childbearing.  We reviewed pelvic floor physical therapy, pessary, and urogynecology consult.  She did well with pessary fitting today and would like to give that a try.  She would like to have the initial consultation with urogynecology just to get some more information and is open to considering pelvic floor physical therapy.  Due to time constraints would like to try pessary first and go onto for pelvic floor physical therapy as needed afterwards.

## 2025-01-29 ENCOUNTER — PATIENT MESSAGE (OUTPATIENT)
Dept: OBSTETRICS AND GYNECOLOGY | Facility: CLINIC | Age: 30
End: 2025-01-29
Payer: COMMERCIAL

## 2025-02-14 NOTE — PROGRESS NOTES
"Pessary Visit        Chief Complaint   Patient presents with    Pessary Check     Pessary type and size: ring without support #4     HPI:  Pain:   Pressure, pessary pushing on rectum   Vaginal bleeding:  no  Problems voiding: no  Vaginal discharge/odor:  no  Any concerns:  pressure     Progress Notes by Nidia Pollard DO (01/17/2025 10:15)  Just recently received pessary and only had it in place twice.  Placed at this morning and felt more pressure in the rectum like she needed to have a bowel movement.    Questions re next pap due.     PHYSICAL EXAM:  /81   Pulse 84   Ht 162.6 cm (64\")   Wt 78.9 kg (174 lb)   LMP  (LMP Unknown)   Breastfeeding No   BMI 29.87 kg/m²   External genitalia- Normal, no lesions  Pessary- Removed, cleaned and replaced.  Patient tolerated the procedure well, pessary appeared in appropriate location and not pressing on the rectum  Vagina- Normal, no discharge, no bleeding  Bladder- Normal, no masses, non tender  Cervix- Normal, no lesions, no discharge, No cervical motion tenderness     Chaperone present during pelvic exam.    ASSESSMENT AND PLAN:   Diagnoses and all orders for this visit:    1. Female genital prolapse (Primary)  Overview:  Uterine prolapse to introitus, paravaginal defects, mild cystocele    Pessary ring without support #4  PFPT, plan to re-consider, prior consult, declines  Urogyn consult for discussion surgery R/B scheduled for March 2025 Dr. Mireles      2. LGSIL of cervix of undetermined significance  Overview:  May 2022 LGSIL, HPV neg- first abn pap.    Aug 2023 Pap neg, HPV neg- recommend repeat pap/cotest 3yrs per [facility]          Counseling:    FU for inability to void, pain, vaginal bleeding, odor, or any concerns  Discussed importance of continued kegel exercises  Remove/replace q 2-4 weeks at home and on a prn basis  Discussed option of trying a smaller pessary or continued trying with the current size.  During her fitting size 3 ring came out with " Valsalva.  She desires to keep current size and keep consultation with urogyn.        Follow Up:  Return for 3-6mo FU pessary.          Nidia Pollard,   02/17/2025    [facility] [place] [facility]  [address] [place] [address]  Dept: [phone]  Dept Fax: [phone]  Loc: [phone]  Loc Fax: [phone]

## 2025-02-17 ENCOUNTER — OFFICE VISIT (OUTPATIENT)
Dept: OBSTETRICS AND GYNECOLOGY | Facility: CLINIC | Age: 30
End: 2025-02-17
Payer: COMMERCIAL

## 2025-02-17 VITALS
HEART RATE: 84 BPM | WEIGHT: 174 LBS | HEIGHT: 64 IN | DIASTOLIC BLOOD PRESSURE: 81 MMHG | SYSTOLIC BLOOD PRESSURE: 123 MMHG | BODY MASS INDEX: 29.71 KG/M2

## 2025-02-17 DIAGNOSIS — R87.612 LGSIL OF CERVIX OF UNDETERMINED SIGNIFICANCE: ICD-10-CM

## 2025-02-17 DIAGNOSIS — N81.89 OTHER FEMALE GENITAL PROLAPSE: Primary | ICD-10-CM

## 2025-03-06 DIAGNOSIS — N81.89 OTHER FEMALE GENITAL PROLAPSE: Primary | ICD-10-CM

## 2025-05-05 ENCOUNTER — TELEPHONE (OUTPATIENT)
Dept: OBSTETRICS AND GYNECOLOGY | Age: 30
End: 2025-05-05
Payer: COMMERCIAL

## 2025-05-05 ENCOUNTER — PATIENT MESSAGE (OUTPATIENT)
Dept: OBSTETRICS AND GYNECOLOGY | Age: 30
End: 2025-05-05

## 2025-05-05 NOTE — TELEPHONE ENCOUNTER
Called and spoke with pt. Advised her we could do another fitting with her appointment coming up on the 19th of this month.

## 2025-05-16 NOTE — PROGRESS NOTES
"Pessary Visit        Chief Complaint   Patient presents with    Pessary Check     Pessary type and size: Ring without support #4    HPI:    Progress Notes by Nidia Pollard DO (02/17/2025 11:00)  Size 4 ring without support worked for her symptoms, however when she was having a bowel movement it would come out.  Does not want to put it in or take it out on a daily basis.  Would like to try a larger size.  Has seen urogynecology and declined surgical management.  Would now like to try pelvic floor physical therapy.    PHYSICAL EXAM:  /73   Pulse 94   Ht 162.6 cm (64\")   Wt 78.9 kg (174 lb)   LMP 05/10/2025   Breastfeeding No   BMI 29.87 kg/m²   External genitalia- Normal, no lesions  Pessary- Pessary type/s (ring) and size/s (5 and 6) tried.  Size 6 was uncomfortable.  Unable to expel size 5 with Valsalva and squatting.  Vagina- Normal, no discharge, no bleeding  Bladder- Normal, no masses, non tender     Chaperone present during pelvic exam.    ASSESSMENT AND PLAN:   Diagnoses and all orders for this visit:    1. Female genital prolapse (Primary)  Comments:  pessary ordered, none in stock  Overview:  Uterine prolapse to introitus, paravaginal defects, mild cystocele    Pessary ring without support #4- fell out spontaneously  Pessary ring without support #5 5/19/2025   PFPT 5/19/2025 reordered  Urogyn Dr. Arsen Mireles Mar 2025, stage III POP, declines surgical mgmt    Orders:  -     Ambulatory Referral to Physical Therapy for Evaluation & Treatment  -     Pessary    2. LGSIL of cervix of undetermined significance  Overview:  May 2022 LGSIL, HPV neg- first abn pap.    Aug 2023 Pap neg, HPV neg- recommend repeat pap/cotest 3yrs per [facility]          Counseling:    FU for inability to void, pain, vaginal bleeding, odor, or any concerns  Do not need to remove for intercourse        Follow Up:  Return in about 3 months (around 8/19/2025) for Pesssary FU.          Nidia Pollard DO  05/19/2025    [facility] [place] [facility] [address]  [facility]  [address] [place] [address]  Dept: [phone]  Dept Fax: [phone]  Loc: [phone]  Loc Fax: [phone]

## 2025-05-19 ENCOUNTER — OFFICE VISIT (OUTPATIENT)
Dept: OBSTETRICS AND GYNECOLOGY | Age: 30
End: 2025-05-19
Payer: COMMERCIAL

## 2025-05-19 VITALS
DIASTOLIC BLOOD PRESSURE: 73 MMHG | HEIGHT: 64 IN | HEART RATE: 94 BPM | BODY MASS INDEX: 29.71 KG/M2 | SYSTOLIC BLOOD PRESSURE: 115 MMHG | WEIGHT: 174 LBS

## 2025-05-19 DIAGNOSIS — N81.89 OTHER FEMALE GENITAL PROLAPSE: Primary | ICD-10-CM

## 2025-05-19 DIAGNOSIS — R87.612 LGSIL OF CERVIX OF UNDETERMINED SIGNIFICANCE: ICD-10-CM

## 2025-05-19 NOTE — INTERVAL H&P NOTE
H&P reviewed.  The patient was examined and there are no changes to the H&P 
[Negative] : Gastrointestinal

## (undated) DEVICE — CURETTE CANN RIGID CRV SHT 10MM STRL

## (undated) DEVICE — STERILE POLYISOPRENE POWDER-FREE SURGICAL GLOVES WITH EMOLLIENT COATING: Brand: PROTEXIS

## (undated) DEVICE — SUT MNCRYL 3/0 SH 27IN DYED Y316H

## (undated) DEVICE — TOWEL,OR,DSP,ST,BLUE,STD,4/PK,20PK/CS: Brand: MEDLINE

## (undated) DEVICE — SUT VIC 0 CT1 J346H

## (undated) DEVICE — SKIN PREP TRAY W/CHG: Brand: MEDLINE INDUSTRIES, INC.

## (undated) DEVICE — SUT VICRYL 3/0 J663H

## (undated) DEVICE — SUT VIC 0 CTX 36IN J978H

## (undated) DEVICE — TRY CATH FOL ADVANCE SIL W/BAG 16F

## (undated) DEVICE — STERILE POLYISOPRENE POWDER-FREE SURGICAL GLOVES: Brand: PROTEXIS

## (undated) DEVICE — SUT GUT CHRM 3/0 CT1 27IN 810H

## (undated) DEVICE — SLV SCD KN/LEN ADJ EXPRSS BLENDED MD 1P/U

## (undated) DEVICE — BNDG CLSR STERI STRIP 1/2X4IN 1PK BGE

## (undated) DEVICE — GOWN,REINF,POLY,SIRUS,BRTH SLV,XLNG/XXL: Brand: MEDLINE

## (undated) DEVICE — PK C/SECT CUST HAR

## (undated) DEVICE — Device

## (undated) DEVICE — BLD SCLPL RIBBACK C/SS SZ10

## (undated) DEVICE — GLV SURG BIOGEL LTX PF 6 1/2

## (undated) DEVICE — CATH URETH INTRMIT ALLPURP LTX 16F RED

## (undated) DEVICE — CVR HNDL LT SURG ACCSSRY BLU STRL

## (undated) DEVICE — DEV TRANSF BLD W/LUER ADPT CA/198

## (undated) DEVICE — ST COL BERKELY TBG

## (undated) DEVICE — PAD GRND REM POLYHESIVE A/ DISP

## (undated) DEVICE — GLV SURG BIOGEL LTX PF 7

## (undated) DEVICE — KT ABG SAMPL PROVENT/PLS 1CC 25G W/2NDARY/NDL/23G

## (undated) DEVICE — NDL HYPO STD REG/BVL WO/SYR 18G 1.5IN

## (undated) DEVICE — LITHOTOMY-SLINGS: Brand: MEDLINE INDUSTRIES, INC.